# Patient Record
Sex: FEMALE | Race: WHITE | NOT HISPANIC OR LATINO | Employment: FULL TIME | URBAN - METROPOLITAN AREA
[De-identification: names, ages, dates, MRNs, and addresses within clinical notes are randomized per-mention and may not be internally consistent; named-entity substitution may affect disease eponyms.]

---

## 2017-02-20 ENCOUNTER — TELEPHONE (OUTPATIENT)
Dept: UROGYNECOLOGY | Facility: CLINIC | Age: 62
End: 2017-02-20

## 2017-02-20 NOTE — TELEPHONE ENCOUNTER
----- Message from Malissa Moran sent at 2/20/2017 11:14 AM CST -----  Contact:  call  //[phone]  please call   after  3:15     Calling to   Discuss  The  Patient  Having    Some    Problems   With   Bladder mesh a  And   Would  Like to  Be  Seen  Sooner and   Maybe  Take a  Urine  Test  odor to  Urine //please call  details

## 2017-02-20 NOTE — TELEPHONE ENCOUNTER
----- Message from Lexi Antonio sent at 2/20/2017 12:28 PM CST -----  Patient is returning nurse's (July) call/please call patient back after 3:15 today.

## 2017-03-02 ENCOUNTER — OFFICE VISIT (OUTPATIENT)
Dept: UROGYNECOLOGY | Facility: CLINIC | Age: 62
End: 2017-03-02
Payer: COMMERCIAL

## 2017-03-02 VITALS
WEIGHT: 110.69 LBS | SYSTOLIC BLOOD PRESSURE: 138 MMHG | TEMPERATURE: 98 F | BODY MASS INDEX: 20.37 KG/M2 | DIASTOLIC BLOOD PRESSURE: 77 MMHG | HEIGHT: 62 IN | HEART RATE: 83 BPM

## 2017-03-02 DIAGNOSIS — R39.15 URINARY URGENCY: ICD-10-CM

## 2017-03-02 DIAGNOSIS — N39.46 MIXED INCONTINENCE URGE AND STRESS: ICD-10-CM

## 2017-03-02 DIAGNOSIS — N81.6 RECTOCELE: ICD-10-CM

## 2017-03-02 DIAGNOSIS — R35.0 URINARY FREQUENCY: Primary | ICD-10-CM

## 2017-03-02 DIAGNOSIS — N81.11 MIDLINE CYSTOCELE: ICD-10-CM

## 2017-03-02 LAB
BILIRUB SERPL-MCNC: NORMAL MG/DL
BLOOD URINE, POC: NORMAL
COLOR, POC UA: YELLOW
GLUCOSE UR QL STRIP: NORMAL
KETONES UR QL STRIP: NORMAL
LEUKOCYTE ESTERASE URINE, POC: NORMAL
NITRITE, POC UA: NORMAL
PH, POC UA: 5
PROTEIN, POC: NORMAL
SPECIFIC GRAVITY, POC UA: 1.02
UROBILINOGEN, POC UA: NORMAL

## 2017-03-02 PROCEDURE — 81002 URINALYSIS NONAUTO W/O SCOPE: CPT | Mod: S$GLB,,, | Performed by: NURSE PRACTITIONER

## 2017-03-02 PROCEDURE — 99203 OFFICE O/P NEW LOW 30 MIN: CPT | Mod: 25,S$GLB,, | Performed by: NURSE PRACTITIONER

## 2017-03-02 PROCEDURE — 1160F RVW MEDS BY RX/DR IN RCRD: CPT | Mod: S$GLB,,, | Performed by: NURSE PRACTITIONER

## 2017-03-02 PROCEDURE — 99999 PR PBB SHADOW E&M-EST. PATIENT-LVL III: CPT | Mod: PBBFAC,,, | Performed by: NURSE PRACTITIONER

## 2017-03-02 RX ORDER — CHOLECALCIFEROL (VITAMIN D3) 25 MCG
185 TABLET ORAL DAILY
COMMUNITY

## 2017-03-02 RX ORDER — METRONIDAZOLE 7.5 MG/G
GEL VAGINAL
Refills: 3 | COMMUNITY
Start: 2017-01-25 | End: 2017-11-21

## 2017-03-02 NOTE — PROGRESS NOTES
"Subjective:       Patient ID: Mary Fitzpatrick is a 61 y.o. female.    Chief Complaint: Vaginal Discharge and Urinary Frequency    HPI  Mary Fitzpatrick is a 61 y.o. female who presents for possible UTI.  Pt was ill about a month ago with sinus congestion and bronchitis.  She was coughing so hard that she felt something pull in the RLQ.  Then about a week after the pulling sensation she had foul smelling urine and she saw some "tissue" being passed in the toilet.  She had cystocele, rectocele and sling/?mesh with Dr. Ortiz at least 4 years ago.  She was concerned that with this past URI that something had happened with the sling and she wanted to be checked out.  As she sits here today she denies any dysuria.  She denies any abdominal pain.  She has urinary frequency of q 1 hour during the day and occasional nocturia x 1.  She wears a thin panty liner and does have some leakage.  She does not always feel the leakage.  She does have some feeling of PVF at times.  She denies any vaginal discharge/ bleeding.  She is not currently sexually active, but when she was, she denies any dyspareunia.    Review of Systems   Constitutional: Negative for activity change, fever and unexpected weight change.   HENT: Negative for hearing loss.    Eyes: Negative for visual disturbance.   Respiratory: Negative for shortness of breath and wheezing.    Cardiovascular: Negative for chest pain, palpitations and leg swelling.   Gastrointestinal: Negative for abdominal pain, constipation and diarrhea.   Genitourinary: Positive for frequency and urgency. Negative for dyspareunia, dysuria, vaginal bleeding and vaginal discharge.   Musculoskeletal: Negative for gait problem and neck pain.   Skin: Negative for rash and wound.   Allergic/Immunologic: Negative for immunocompromised state.   Neurological: Negative for tremors, speech difficulty and weakness.   Hematological: Does not bruise/bleed easily.   Psychiatric/Behavioral: Negative for " agitation and confusion.       Objective:      Physical Exam   Constitutional: She is oriented to person, place, and time. She appears well-developed and well-nourished. No distress.   HENT:   Head: Normocephalic and atraumatic.   Neck: Neck supple. No thyromegaly present.   Pulmonary/Chest: Effort normal. No respiratory distress.   Abdominal: Soft. There is no tenderness. No hernia.   Musculoskeletal: Normal range of motion.   Neurological: She is alert and oriented to person, place, and time.   Skin: Skin is warm and dry. No rash noted.   Psychiatric: She has a normal mood and affect. Her behavior is normal.     Pelvic Exam:  V: No lesions. No palpable nodes.   Va: no discharge or bleeding.  Good length and support.  No feeling of mesh or scar tissue noted  Meatus:No caruncle or stenosis  Urethra: Non tender. No suburethral masses.  Cx/Cuff: Normal   Uterus: surgically absent  Ad: No mass or tenderness.  Levators :Symmetrical. Normal tone. Non tender.  BL: Non tender  RV: No hemorrhoids.      Assessment:       1. Urinary frequency    2. Urinary urgency    3. Mixed incontinence urge and stress    4. Midline cystocele    5. Rectocele        Plan:       Urinary frequency- pt does not wish to take any daily medication at this time.  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  NP did speak with pt about trial of myrbetriq or if she want to try ditropan IR on occasion when she is driving or on the weekends she will call and let us know.  -     POCT URINE DIPSTICK WITHOUT MICROSCOPE    Urinary urgency- as noted above    Mixed incontinence urge and stress- as noted above    Midline cystocele - surgically repaired    Rectocele- surgically repaired    RTC PRN

## 2017-03-03 ENCOUNTER — HOSPITAL ENCOUNTER (EMERGENCY)
Facility: HOSPITAL | Age: 62
Discharge: HOME | End: 2017-03-03
Attending: EMERGENCY MEDICINE
Payer: COMMERCIAL

## 2017-03-03 VITALS
DIASTOLIC BLOOD PRESSURE: 65 MMHG | TEMPERATURE: 98 F | BODY MASS INDEX: 20.24 KG/M2 | RESPIRATION RATE: 16 BRPM | OXYGEN SATURATION: 99 % | HEART RATE: 70 BPM | HEIGHT: 62 IN | SYSTOLIC BLOOD PRESSURE: 134 MMHG | WEIGHT: 110 LBS

## 2017-03-03 DIAGNOSIS — R42 DIZZINESS: ICD-10-CM

## 2017-03-03 DIAGNOSIS — H81.10 BENIGN POSITIONAL VERTIGO, UNSPECIFIED LATERALITY: Primary | ICD-10-CM

## 2017-03-03 LAB
ALBUMIN SERPL BCP-MCNC: 3.9 G/DL
ALP SERPL-CCNC: 58 U/L
ALT SERPL W/O P-5'-P-CCNC: 15 U/L
ANION GAP SERPL CALC-SCNC: 9 MMOL/L
AST SERPL-CCNC: 20 U/L
BASOPHILS # BLD AUTO: 0.1 K/UL
BASOPHILS NFR BLD: 1.2 %
BILIRUB SERPL-MCNC: 0.3 MG/DL
BUN SERPL-MCNC: 10 MG/DL
CALCIUM SERPL-MCNC: 9.9 MG/DL
CHLORIDE SERPL-SCNC: 107 MMOL/L
CO2 SERPL-SCNC: 26 MMOL/L
CREAT SERPL-MCNC: 0.8 MG/DL
DIFFERENTIAL METHOD: ABNORMAL
EOSINOPHIL # BLD AUTO: 0.1 K/UL
EOSINOPHIL NFR BLD: 1.6 %
ERYTHROCYTE [DISTWIDTH] IN BLOOD BY AUTOMATED COUNT: 14.1 %
EST. GFR  (AFRICAN AMERICAN): >60 ML/MIN/1.73 M^2
EST. GFR  (NON AFRICAN AMERICAN): >60 ML/MIN/1.73 M^2
GLUCOSE SERPL-MCNC: 94 MG/DL
HCT VFR BLD AUTO: 39.9 %
HGB BLD-MCNC: 13.1 G/DL
LYMPHOCYTES # BLD AUTO: 1.5 K/UL
LYMPHOCYTES NFR BLD: 35.2 %
MAGNESIUM SERPL-MCNC: 2.2 MG/DL
MCH RBC QN AUTO: 30.7 PG
MCHC RBC AUTO-ENTMCNC: 32.9 %
MCV RBC AUTO: 93 FL
MONOCYTES # BLD AUTO: 0.5 K/UL
MONOCYTES NFR BLD: 12.3 %
NEUTROPHILS # BLD AUTO: 2.1 K/UL
NEUTROPHILS NFR BLD: 49.7 %
PLATELET # BLD AUTO: 303 K/UL
PMV BLD AUTO: 7.6 FL
POTASSIUM SERPL-SCNC: 4.2 MMOL/L
PROT SERPL-MCNC: 6.5 G/DL
RBC # BLD AUTO: 4.29 M/UL
SODIUM SERPL-SCNC: 142 MMOL/L
WBC # BLD AUTO: 4.1 K/UL

## 2017-03-03 PROCEDURE — 36415 COLL VENOUS BLD VENIPUNCTURE: CPT

## 2017-03-03 PROCEDURE — 93005 ELECTROCARDIOGRAM TRACING: CPT

## 2017-03-03 PROCEDURE — 25500020 PHARM REV CODE 255: Performed by: EMERGENCY MEDICINE

## 2017-03-03 PROCEDURE — 80053 COMPREHEN METABOLIC PANEL: CPT

## 2017-03-03 PROCEDURE — 99284 EMERGENCY DEPT VISIT MOD MDM: CPT

## 2017-03-03 PROCEDURE — A9585 GADOBUTROL INJECTION: HCPCS | Performed by: EMERGENCY MEDICINE

## 2017-03-03 PROCEDURE — 85025 COMPLETE CBC W/AUTO DIFF WBC: CPT

## 2017-03-03 PROCEDURE — 83735 ASSAY OF MAGNESIUM: CPT

## 2017-03-03 RX ORDER — GADOBUTROL 604.72 MG/ML
INJECTION INTRAVENOUS
Status: DISCONTINUED
Start: 2017-03-03 | End: 2017-03-03 | Stop reason: HOSPADM

## 2017-03-03 RX ORDER — MECLIZINE HYDROCHLORIDE 25 MG/1
25 TABLET ORAL 3 TIMES DAILY PRN
Qty: 20 TABLET | Refills: 0 | Status: SHIPPED | OUTPATIENT
Start: 2017-03-03 | End: 2018-12-13

## 2017-03-03 RX ORDER — GADOBUTROL 604.72 MG/ML
5 INJECTION INTRAVENOUS
Status: COMPLETED | OUTPATIENT
Start: 2017-03-03 | End: 2017-03-03

## 2017-03-03 RX ADMIN — GADOBUTROL 5 ML: 604.72 INJECTION INTRAVENOUS at 10:03

## 2017-03-03 NOTE — ED PROVIDER NOTES
Encounter Date: 3/3/2017       History     Chief Complaint   Patient presents with    Dizziness     yesterday and today      Review of patient's allergies indicates:   Allergen Reactions    Penicillins Anaphylaxis    Sulfa (sulfonamide antibiotics) Hives     HPI Comments: 61-year-old female with a past medical history of mitral valve prolapse and arthritis presents with a chief complaint of dizziness.  She reports it feels like the room is spinning, and that the symptoms started acutely 2 days ago.  She reports her symptoms are worse with sitting forward and are intermittent at times.  She also reports some visual changes to her right eye yesterday, where it looked as if she was looking through a crystal  for a jared. she reports visual changes have resolved.  She denies any associated paresthesias, changes in speech, weakness, chest pain, nausea/vomiting, or headache.  She has not taken anything for relief for symptoms.    The history is provided by the patient.     Past Medical History:   Diagnosis Date    Allergy     Arthritis     HEARING LOSS     Mitral valve prolapse     Scoliosis      Past Surgical History:   Procedure Laterality Date    BLADDER SUSPENSION      2006 2012    BUNIONECTOMY      HYSTERECTOMY       History reviewed. No pertinent family history.  Social History   Substance Use Topics    Smoking status: Never Smoker    Smokeless tobacco: None    Alcohol use Yes      Comment: 1-2 drinks social     Review of Systems   Constitutional: Negative for chills and fever.   HENT: Negative for congestion.    Eyes: Positive for visual disturbance.   Respiratory: Negative for cough and shortness of breath.    Cardiovascular: Negative for chest pain.   Gastrointestinal: Negative for abdominal pain, nausea and vomiting.   Genitourinary: Negative for dysuria.   Musculoskeletal: Negative for gait problem.   Skin: Negative for color change.   Neurological: Positive for dizziness. Negative for numbness.    Psychiatric/Behavioral: Negative for agitation.       Physical Exam   Initial Vitals   BP Pulse Resp Temp SpO2   03/03/17 0824 03/03/17 0824 03/03/17 0824 03/03/17 0824 03/03/17 0824   144/67 79 18 98.3 °F (36.8 °C) 99 %     Physical Exam    Nursing note and vitals reviewed.  Constitutional: She appears well-developed and well-nourished.   HENT:   Head: Atraumatic.   Eyes: EOM are normal. Pupils are equal, round, and reactive to light.   Neck: Normal range of motion.   Cardiovascular: Normal rate and regular rhythm.   Pulmonary/Chest: Breath sounds normal.   Abdominal: Soft. Bowel sounds are normal.   Musculoskeletal: Normal range of motion.        Right shoulder: Normal.        Left shoulder: Normal.   Neurological: She is alert and oriented to person, place, and time.   Skin: Skin is warm and dry.   Psychiatric: She has a normal mood and affect.         ED Course   Procedures  Labs Reviewed   CBC W/ AUTO DIFFERENTIAL - Abnormal; Notable for the following:        Result Value    MPV 7.6 (*)     All other components within normal limits   COMPREHENSIVE METABOLIC PANEL   MAGNESIUM             Medical Decision Making:   Initial Assessment:   61-year-old female presented with a chief complaint of dizziness.  Differential Diagnosis:   Initial differential diagnosis included but not limited to intracranial mass, CVA, TIA, and benign positional vertigo.  Clinical Tests:   Lab Tests: Ordered and Reviewed  Radiological Study: Ordered and Reviewed  ED Management:  The patient was emergently evaluated in the ED, her evaluation was significant for a middle-aged female with a normal neurologic exam at present.  The patient's labs showed no acute abnormalities.  The patient's dizziness is resolved at present.  The patient's MRI of her brain showed no acute abnormalities.  The etiology for her symptoms is likely benign positional vertigo.  She is stable for discharge to home.  She'll be discharged home with by mouth meclizine  and she is to follow-up with her ENT physician for further care.                     ED Course     Clinical Impression:   The primary encounter diagnosis was Benign positional vertigo, unspecified laterality. A diagnosis of Dizziness was also pertinent to this visit.          Jett Molina MD  03/03/17 8428

## 2017-03-03 NOTE — DISCHARGE INSTRUCTIONS
Benign Paroxysmal Positional Vertigo  Benign paroxysmal positional vertigo (BPPV) is a problem with the inner ear. The inner ear contains the vestibular system. This system is what helps you keep your balance. BPPV causes a feeling of spinning. It is a common problem of the vestibular system.  Understanding the vestibular system  The vestibular system of the ear is made up of very tiny parts. They include the utricle, saccule, and semicircular canals. The utricle is a tiny organ that contains calcium crystals. In some people, the crystals can move into the semicircular canals. When this happens, the system no longer works as it should. This causes BPPV. Benign means it is not life-threatening. Paroxysmal means it happens suddenly. Positional means that it happens when you move your head. Vertigo is a feeling of spinning.  What causes BPPV?  Causes include injury to your head or neck. Other problems with the vestibular system may cause BPPV. In many people, the cause of BPPV is not known.  Symptoms of BPPV  You many have repeated feelings of spinning (vertigo). The vertigo usually lasts less than 1 minute. Some movements, suchas rolling over in bed, can bring on vertigo.  Diagnosing BPPV  Your primary health care provider may diagnose and treat your BPPV. Or you may see an ear, nose, and throat doctor (otolaryngologist). In some cases, you may see a nervous system doctor (neurologist).  The health care provider will ask about your symptoms and your medical history. He or she will examine you. You may have hearing and balance tests. As part of the exam, your health care provider may have you move your head and body in certain ways. If you have BPPV, the movements can bring on vertigo. Your provider will also look for abnormal movements of your eyes. You may have other tests to check your vestibular or nervous systems.  Treatment for BPPV  Your health care provider may try to move the calcium crystals. This is done  by having you move your head and neck in certain ways. This treatment is safe and often works well. You may also be told to do these movements at home. You may still have vertigo for a few weeks. Your health care provider will recheck your symptoms, usually in about a month. Special physical therapy may also be part of treatment.  In rare cases surgery may be needed for BPPV that does not go away.     When to call the health care provider  Call your health care provider right away if you have any of these:  · Symptoms that do not go away with treatment  · Symptoms that get worse  · New symptoms   Date Last Reviewed: 3/19/2015  © 5030-7344 [employer]. [address] [place] [address], [place] [address]. All rights reserved. This information is not intended as a substitute for professional medical care. Always follow your healthcare professional's instructions.

## 2017-03-03 NOTE — ED AVS SNAPSHOT
[facility] [address]  [facility] [address]               Mary Fitzpatrick   3/3/2017  8:27 AM   ED    Description:  Female : 1955   Department:  [facility]           Your Care was Coordinated By:     Provider Role From To    Jett Molina MD Attending Provider 17 0833 --      Reason for Visit     Dizziness           Diagnoses this Visit        Comments    Benign positional vertigo, unspecified laterality    -  Primary     Dizziness           ED Disposition     None           To Do List           Follow-up Information     Follow up with Alek Mejia MD. Schedule an appointment as soon as possible for a visit in 1 week.    Specialty:  Otolaryngology    Why:  return to the ED, As needed, If symptoms worsen    Contact information:    [address]  [facility] [address] [phone]         These Medications        Disp Refills Start End    meclizine (ANTIVERT) 25 mg tablet 20 tablet 0 3/3/2017     Take 1 tablet (25 mg total) by mouth 3 (three) times daily as needed for Dizziness. - Oral    Pharmacy: [facility] [id number] [place] [address] - [address] AT [facility] Ph #: [phone]         [facility]     [facility] -  Assistance  Registered nurses in the [facility] provide clinical advisement, health education, appointment booking, and other advisory services.  Call for this free service at [phone].             Medications           Message regarding Medications     Verify the changes and/or additions to your medication regime listed below are the same as discussed with your clinician today.  If any of these changes or additions are incorrect, please notify your healthcare provider.        START taking these NEW medications        Refills    meclizine (ANTIVERT) 25 mg tablet 0    Sig: Take 1 tablet (25 mg total) by mouth 3 (three)  "times daily as needed for Dizziness.    Class: Print    Route: Oral      These medications were administered today        Dose Freq    gadobutrol 10 mmol/10 mL (1 mmol/mL) Soln      Notes to Pharmacy: Created by cabinet override    gadobutrol 5 mL 5 mL IMG once as needed    Sig: Inject 5 mLs into the vein ONCE PRN for contrast.    Class: Normal    Route: Intravenous           Verify that the below list of medications is an accurate representation of the medications you are currently taking.  If none reported, the list may be blank. If incorrect, please contact your healthcare provider. Carry this list with you in case of emergency.           Current Medications     estradiol 0.05 mg/24 hr td ptsw (VIVELLE-DOT) 0.05 mg/24 hr Place 1 patch onto the skin once a week.    gadobutrol 10 mmol/10 mL (1 mmol/mL) Soln     meclizine (ANTIVERT) 25 mg tablet Take 1 tablet (25 mg total) by mouth 3 (three) times daily as needed for Dizziness.    metronidazole (METROGEL) 0.75 % vaginal gel NIK 1 APPLICATION VAGINALLY QHS FOR 5 DAYS    PANTOPRAZOLE SODIUM (PROTONIX ORAL) Take by mouth.    vitamin D 1000 units Tab Take 185 mg by mouth once daily.           Clinical Reference Information           Your Vitals Were     BP Pulse Temp Resp Height Weight    144/67 79 98.3 °F (36.8 °C) (Oral) 18 5' 2" (1.575 m) 49.9 kg (110 lb)    SpO2 BMI             99% 20.12 kg/m2         Allergies as of 3/3/2017        Reactions    Penicillins Anaphylaxis    Sulfa (Sulfonamide Antibiotics) Hives      Immunizations Administered on Date of Encounter - 3/3/2017     None      ED Micro, Lab, POCT     Start Ordered       Status Ordering Provider    03/03/17 0902 03/03/17 0901  Comprehensive Metabolic Panel (CMP)  STAT      Final result     03/03/17 0902 03/03/17 0901  Complete Blood Count (CBC)  STAT      Final result     03/03/17 0902 03/03/17 0901  Magnesium  STAT      Final result       ED Imaging Orders     Start Ordered       Status Ordering Provider    " 03/03/17 0902 03/03/17 0901  MRI Brain W WO Contrast  1 time imaging      Final result         Discharge Instructions         Benign Paroxysmal Positional Vertigo  Benign paroxysmal positional vertigo (BPPV) is a problem with the inner ear. The inner ear contains the vestibular system. This system is what helps you keep your balance. BPPV causes a feeling of spinning. It is a common problem of the vestibular system.  Understanding the vestibular system  The vestibular system of the ear is made up of very tiny parts. They include the utricle, saccule, and semicircular canals. The utricle is a tiny organ that contains calcium crystals. In some people, the crystals can move into the semicircular canals. When this happens, the system no longer works as it should. This causes BPPV. Benign means it is not life-threatening. Paroxysmal means it happens suddenly. Positional means that it happens when you move your head. Vertigo is a feeling of spinning.  What causes BPPV?  Causes include injury to your head or neck. Other problems with the vestibular system may cause BPPV. In many people, the cause of BPPV is not known.  Symptoms of BPPV  You many have repeated feelings of spinning (vertigo). The vertigo usually lasts less than 1 minute. Some movements, suchas rolling over in bed, can bring on vertigo.  Diagnosing BPPV  Your primary health care provider may diagnose and treat your BPPV. Or you may see an ear, nose, and throat doctor (otolaryngologist). In some cases, you may see a nervous system doctor (neurologist).  The health care provider will ask about your symptoms and your medical history. He or she will examine you. You may have hearing and balance tests. As part of the exam, your health care provider may have you move your head and body in certain ways. If you have BPPV, the movements can bring on vertigo. Your provider will also look for abnormal movements of your eyes. You may have other tests to check your  vestibular or nervous systems.  Treatment for BPPV  Your health care provider may try to move the calcium crystals. This is done by having you move your head and neck in certain ways. This treatment is safe and often works well. You may also be told to do these movements at home. You may still have vertigo for a few weeks. Your health care provider will recheck your symptoms, usually in about a month. Special physical therapy may also be part of treatment.  In rare cases surgery may be needed for BPPV that does not go away.     When to call the health care provider  Call your health care provider right away if you have any of these:  · Symptoms that do not go away with treatment  · Symptoms that get worse  · New symptoms   Date Last Reviewed: 3/19/2015  © 1701-9490 [employer]. [address] [place] [address], [place] [address]. All rights reserved. This information is not intended as a substitute for professional medical care. Always follow your healthcare professional's instructions.          Your Scheduled Appointments     Mar 03, 2017  7:30 PM CST   Mri Brain Cont with [facility] MRI1 300 LB LIMIT   [facility] ([place] [facility]) [address] [place] [id number] [phone]              [facility] Sign-Up     Activating your [facility] account is as easy as 1-2-3!     1) Visit [email or web address], select Sign Up Now, enter this activation code and your date of birth, then select Next.  [email or web address]  Expires: 4/16/2017 10:05 AM      2) Create a username and password to use when you visit [facility] in the future and select a security question in case you lose your password and select Next.    3) Enter your e-mail address and click Sign Up!    Additional Information  If you have questions, please e-mail [email or web address] or call [phone] to talk to our [facility] staff. Remember, [facility] is NOT to be used for urgent needs. For medical emergencies, dial 911.           [facility] complies with applicable Federal civil rights laws and does not discriminate on the basis of race, color, national origin, age, disability, or sex.        Language Assistance Services     ATTENTION: Language assistance services are available, free of charge. Please call [phone].      ATENCIÓN: Si habla español, tiene a chance disposición servicios gratuitos de asistencia lingüística. Llame al [phone].     CHÚ Ý: N?u b?n nói Ti?ng Vi?t, có các d?ch v? h? tr? ngôn ng? mi?n phí dành cho b?n. G?i s? [phone].

## 2017-03-17 ENCOUNTER — TELEPHONE (OUTPATIENT)
Dept: UROGYNECOLOGY | Facility: CLINIC | Age: 62
End: 2017-03-17

## 2017-03-17 NOTE — TELEPHONE ENCOUNTER
----- Message from Malissa Moran sent at 3/17/2017  2:19 PM CDT -----  Contact:   call  //[phone]    Calling about a  Med   For overactive  Bladder //  Please call

## 2017-03-17 NOTE — TELEPHONE ENCOUNTER
Pt has thought about her options with medication for OAB and she would like to try the Myrbetriq. Please advise. Thanks.

## 2017-03-21 NOTE — TELEPHONE ENCOUNTER
I have sent in myrbetriq 25 mg to [facility] on .  If this is too expensive, please call our office and let us know, don't get it.  Also I would like to have a follow up with her in about a month.  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

## 2017-03-27 ENCOUNTER — TELEPHONE (OUTPATIENT)
Dept: UROGYNECOLOGY | Facility: CLINIC | Age: 62
End: 2017-03-27

## 2017-03-27 NOTE — TELEPHONE ENCOUNTER
----- Message from Essie Alida sent at 3/24/2017  3:54 PM CDT -----  Returning your call.  Please call patient at [phone].

## 2017-03-27 NOTE — TELEPHONE ENCOUNTER
----- Message from Sydney Portillo sent at 3/27/2017 12:24 PM CDT -----  Contact: self  Patient returning call to July    Please call her at  anytime after 3:30   Thanks

## 2017-04-18 ENCOUNTER — OFFICE VISIT (OUTPATIENT)
Dept: UROGYNECOLOGY | Facility: CLINIC | Age: 62
End: 2017-04-18
Payer: COMMERCIAL

## 2017-04-18 VITALS
TEMPERATURE: 99 F | HEART RATE: 74 BPM | HEIGHT: 62 IN | WEIGHT: 109.56 LBS | SYSTOLIC BLOOD PRESSURE: 102 MMHG | BODY MASS INDEX: 20.16 KG/M2 | DIASTOLIC BLOOD PRESSURE: 62 MMHG

## 2017-04-18 DIAGNOSIS — N39.46 MIXED INCONTINENCE URGE AND STRESS: ICD-10-CM

## 2017-04-18 DIAGNOSIS — R35.0 URINARY FREQUENCY: Primary | ICD-10-CM

## 2017-04-18 DIAGNOSIS — R39.15 URINARY URGENCY: ICD-10-CM

## 2017-04-18 LAB
BILIRUB SERPL-MCNC: NORMAL MG/DL
BLOOD URINE, POC: NORMAL
COLOR, POC UA: YELLOW
GLUCOSE UR QL STRIP: NORMAL
KETONES UR QL STRIP: NORMAL
LEUKOCYTE ESTERASE URINE, POC: NORMAL
NITRITE, POC UA: NORMAL
PH, POC UA: 7
PROTEIN, POC: NORMAL
SPECIFIC GRAVITY, POC UA: 1.01
UROBILINOGEN, POC UA: NORMAL

## 2017-04-18 PROCEDURE — 99999 PR PBB SHADOW E&M-EST. PATIENT-LVL III: CPT | Mod: PBBFAC,,, | Performed by: NURSE PRACTITIONER

## 2017-04-18 PROCEDURE — 1160F RVW MEDS BY RX/DR IN RCRD: CPT | Mod: S$GLB,,, | Performed by: NURSE PRACTITIONER

## 2017-04-18 PROCEDURE — 99213 OFFICE O/P EST LOW 20 MIN: CPT | Mod: 25,S$GLB,, | Performed by: NURSE PRACTITIONER

## 2017-04-18 PROCEDURE — 81002 URINALYSIS NONAUTO W/O SCOPE: CPT | Mod: S$GLB,,, | Performed by: NURSE PRACTITIONER

## 2017-04-18 RX ORDER — PANTOPRAZOLE SODIUM 40 MG/1
TABLET, DELAYED RELEASE ORAL
Refills: 6 | COMMUNITY
Start: 2017-03-26 | End: 2017-11-21

## 2017-04-18 NOTE — PROGRESS NOTES
Subjective:       Patient ID: Mary Fitzpatrick is a 61 y.o. female.    Chief Complaint: Follow-up medication    HPI  Mary Fitzpatrick is a 61 y.o. female who presents today for follow up on her myrbetriq.  She was started on the myrbetriq 25 mg on 03/17/17.  She has noticed a significant improvement in her symptoms.  She has a decrease in her frequency to about every 4-5 x during the day at home.  When she is at work it is probably x 2 during the day.  She occasionally has nocturia x 1 but this mostly occurs when she is drinking coffee to late in the day.  Her urgency which is probably her most bothersome symptom has dramatically decreased.  She still has occasional incontinence, but this has also improved.  Her biggest challenge is when she is riding in the car.  It seems that when she is in the car she needs to urinate every 30 minutes.    She denies any side effects with the medication.  She is overall happy with her status at this time.    Review of Systems   Constitutional: Negative for activity change, fever and unexpected weight change.   HENT: Negative for hearing loss.    Eyes: Negative for visual disturbance.   Respiratory: Negative for shortness of breath and wheezing.    Cardiovascular: Negative for chest pain, palpitations and leg swelling.   Gastrointestinal: Negative for abdominal pain, constipation and diarrhea.   Genitourinary: Positive for frequency. Negative for dyspareunia, dysuria, urgency, vaginal bleeding and vaginal discharge.   Musculoskeletal: Negative for gait problem and neck pain.   Skin: Negative for rash and wound.   Allergic/Immunologic: Negative for immunocompromised state.   Neurological: Negative for tremors, speech difficulty and weakness.   Hematological: Does not bruise/bleed easily.   Psychiatric/Behavioral: Negative for agitation and confusion.       Objective:      Physical Exam   Constitutional: She is oriented to person, place, and time. She appears well-developed and  well-nourished. No distress.   HENT:   Head: Normocephalic and atraumatic.   Neck: Neck supple. No thyromegaly present.   Pulmonary/Chest: Effort normal. No respiratory distress.   Abdominal: Soft. There is no tenderness. No hernia.   Musculoskeletal: Normal range of motion.   Neurological: She is alert and oriented to person, place, and time.   Skin: Skin is warm and dry. No rash noted.   Psychiatric: She has a normal mood and affect. Her behavior is normal.     Pelvic Exam:  Deferred at this time.      Assessment:       1. Urinary frequency    2. Urinary urgency    3. Mixed incontinence urge and stress        Plan:       Urinary frequency- continue with the myrbetriq at this time.  If she notices a worsening in her symptoms, we will increase the myrbetriq to 50 mg daily.    -     POCT URINE DIPSTICK WITHOUT MICROSCOPE    Urinary urgency- as noted above    Mixed incontinence urge and stress- as noted above    RTC 6 months or PRN

## 2017-11-15 RX ORDER — MIRABEGRON 25 MG/1
TABLET, FILM COATED, EXTENDED RELEASE ORAL
Qty: 30 TABLET | Refills: 5 | Status: SHIPPED | OUTPATIENT
Start: 2017-11-15 | End: 2018-08-20 | Stop reason: SDUPTHER

## 2017-11-21 ENCOUNTER — HOSPITAL ENCOUNTER (EMERGENCY)
Facility: HOSPITAL | Age: 62
Discharge: HOME | End: 2017-11-21
Attending: EMERGENCY MEDICINE
Payer: COMMERCIAL

## 2017-11-21 VITALS
WEIGHT: 101 LBS | RESPIRATION RATE: 18 BRPM | HEART RATE: 76 BPM | DIASTOLIC BLOOD PRESSURE: 63 MMHG | OXYGEN SATURATION: 98 % | SYSTOLIC BLOOD PRESSURE: 129 MMHG | TEMPERATURE: 98 F | HEIGHT: 62 IN | BODY MASS INDEX: 18.58 KG/M2

## 2017-11-21 DIAGNOSIS — N30.01 ACUTE CYSTITIS WITH HEMATURIA: Primary | ICD-10-CM

## 2017-11-21 LAB
BACTERIA #/AREA URNS HPF: ABNORMAL /HPF
BILIRUB UR QL STRIP: NEGATIVE
CLARITY UR: ABNORMAL
COLOR UR: YELLOW
GLUCOSE UR QL STRIP: NEGATIVE
HGB UR QL STRIP: ABNORMAL
HYALINE CASTS #/AREA URNS LPF: 0 /LPF
KETONES UR QL STRIP: NEGATIVE
LEUKOCYTE ESTERASE UR QL STRIP: ABNORMAL
MICROSCOPIC COMMENT: ABNORMAL
NITRITE UR QL STRIP: NEGATIVE
PH UR STRIP: >8 [PH] (ref 5–8)
PROT UR QL STRIP: ABNORMAL
RBC #/AREA URNS HPF: >100 /HPF (ref 0–4)
SP GR UR STRIP: 1.02 (ref 1–1.03)
URN SPEC COLLECT METH UR: ABNORMAL
UROBILINOGEN UR STRIP-ACNC: 1 EU/DL
WBC #/AREA URNS HPF: >100 /HPF (ref 0–5)

## 2017-11-21 PROCEDURE — 25000003 PHARM REV CODE 250: Performed by: PHYSICIAN ASSISTANT

## 2017-11-21 PROCEDURE — 87186 SC STD MICRODIL/AGAR DIL: CPT

## 2017-11-21 PROCEDURE — 87077 CULTURE AEROBIC IDENTIFY: CPT

## 2017-11-21 PROCEDURE — 99283 EMERGENCY DEPT VISIT LOW MDM: CPT

## 2017-11-21 PROCEDURE — 87088 URINE BACTERIA CULTURE: CPT

## 2017-11-21 PROCEDURE — 87086 URINE CULTURE/COLONY COUNT: CPT

## 2017-11-21 PROCEDURE — 81000 URINALYSIS NONAUTO W/SCOPE: CPT

## 2017-11-21 RX ORDER — CIPROFLOXACIN 500 MG/1
500 TABLET ORAL 2 TIMES DAILY
Qty: 14 TABLET | Refills: 0 | Status: SHIPPED | OUTPATIENT
Start: 2017-11-21 | End: 2017-11-28

## 2017-11-21 RX ORDER — PHENAZOPYRIDINE HYDROCHLORIDE 100 MG/1
200 TABLET, FILM COATED ORAL
Status: COMPLETED | OUTPATIENT
Start: 2017-11-21 | End: 2017-11-21

## 2017-11-21 RX ORDER — GABAPENTIN 100 MG/1
100 CAPSULE ORAL NIGHTLY
COMMUNITY

## 2017-11-21 RX ORDER — CIPROFLOXACIN 500 MG/1
500 TABLET ORAL
Status: COMPLETED | OUTPATIENT
Start: 2017-11-21 | End: 2017-11-21

## 2017-11-21 RX ORDER — PHENAZOPYRIDINE HYDROCHLORIDE 200 MG/1
200 TABLET, FILM COATED ORAL 3 TIMES DAILY
Qty: 6 TABLET | Refills: 0 | Status: SHIPPED | OUTPATIENT
Start: 2017-11-21 | End: 2017-12-01

## 2017-11-21 RX ADMIN — PHENAZOPYRIDINE HYDROCHLORIDE 200 MG: 100 TABLET ORAL at 10:11

## 2017-11-21 RX ADMIN — CIPROFLOXACIN 500 MG: 500 TABLET, FILM COATED ORAL at 10:11

## 2017-11-22 NOTE — ED PROVIDER NOTES
Encounter Date: 11/21/2017    SCRIBE #1 NOTE: I, Melida Macdonald, am scribing for, and in the presence of, Ana Enriquez PA-C.       History     Chief Complaint   Patient presents with    Urinary Frequency     pain, frequency, blood in urine       11/21/2017  9:48 PM    Chief Complaint: Urinary Frequency      The patient is a 61 y.o. female who presents with urinary frequency x 3 days ago with associated symptoms of dysuria, decreased urine and hematuria. Pt endorses chronic back pain. Pt endorses concerns for UTI. Pt reports she has not taken anything for pain. Denies fever. Allergic to penicillin and sulfa. PMHx includes mtral valve prolapse and scoliosis. PSHx includes bladder suspension and hysterectomy.         The history is provided by the patient.     Review of patient's allergies indicates:   Allergen Reactions    Penicillins Anaphylaxis    Sulfa (sulfonamide antibiotics) Hives     Past Medical History:   Diagnosis Date    Allergy     Arthritis     HEARING LOSS     Mitral valve prolapse     Scoliosis      Past Surgical History:   Procedure Laterality Date    BLADDER SUSPENSION      2006 2012    BUNIONECTOMY      HYSTERECTOMY       History reviewed. No pertinent family history.  Social History   Substance Use Topics    Smoking status: Never Smoker    Smokeless tobacco: Never Used    Alcohol use Yes      Comment: 1-2 drinks social     Review of Systems   Constitutional: Negative for chills and fever.   HENT: Negative for facial swelling and trouble swallowing.    Eyes: Negative for discharge.   Respiratory: Negative for cough, chest tightness, shortness of breath and wheezing.    Cardiovascular: Negative for chest pain and palpitations.   Gastrointestinal: Negative for abdominal pain, diarrhea, nausea and vomiting.   Genitourinary: Positive for decreased urine volume, dysuria, frequency and hematuria.   Musculoskeletal: Positive for back pain (Chronic). Negative for arthralgias, joint swelling,  myalgias, neck pain and neck stiffness.   Skin: Negative for color change, pallor, rash and wound.   Neurological: Negative for dizziness, syncope, weakness, light-headedness, numbness and headaches.   Hematological: Does not bruise/bleed easily.   Psychiatric/Behavioral: The patient is not nervous/anxious.        Physical Exam     Initial Vitals [11/21/17 2132]   BP Pulse Resp Temp SpO2   129/63 76 18 97.9 °F (36.6 °C) 98 %      MAP       85         Physical Exam    Nursing note and vitals reviewed.  Constitutional: She appears well-developed and well-nourished. She is not diaphoretic. No distress.   HENT:   Head: Normocephalic and atraumatic.   Right Ear: External ear normal.   Left Ear: External ear normal.   Nose: Nose normal.   Mouth/Throat: Oropharynx is clear and moist.   Eyes: Conjunctivae are normal.   Neck: Normal range of motion. Neck supple.   Cardiovascular: Normal rate, regular rhythm, normal heart sounds and intact distal pulses.   No murmur heard.  Pulmonary/Chest: Breath sounds normal. No respiratory distress. She has no wheezes. She has no rhonchi. She has no rales.   Abdominal: Soft. She exhibits no distension and no mass. There is tenderness in the suprapubic area. There is no CVA tenderness.   Musculoskeletal: Normal range of motion. She exhibits no edema or tenderness.   Lymphadenopathy:     She has no cervical adenopathy.   Neurological: She is alert and oriented to person, place, and time. She has normal strength. No sensory deficit.   Skin: Skin is warm and dry. No rash and no abscess noted. No erythema.   Psychiatric: She has a normal mood and affect.         ED Course   Procedures  Labs Reviewed   URINALYSIS             Medical Decision Making:   History:   Old Medical Records: I decided to obtain old medical records.  Differential Diagnosis:   UTI  Pyelonephritis  Renal Colic  Acute abdomen    Clinical Tests:   Lab Tests: Ordered and Reviewed       APC / Resident Notes:   Urinalysis shows  evidence for infection.  Urine culture will be sent.  She is allergic to PCN, and we will also avoid cephalosporins.  She will be given prescription for Cipro and culture will be followed.  She is also given a prescription for pyridium.  Low suspicion for renal colic or pyelonephritis at this time, and no further imaging or testing necessary.  She voices understanding and is agreeable to the plan.  She is given specific return precautions.        Scribe Attestation:   Scribe #1: I performed the above scribed service and the documentation accurately describes the services I performed. I attest to the accuracy of the note.    Attending Attestation:     Physician Attestation Statement for NP/PA:   I have conducted a face to face encounter with this patient in addition to the NP/PA, due to Medical Complexity    Other NP/PA Attestation Additions:    History of Present Illness: 61-year-old female presented with a chief complaint of urinary frequency.   Physical Exam: Awake, alert, and oriented ×4.   Medical Decision Making: Initial differential diagnosis included but not limited to pyelonephritis, cystitis, and cervicitis.  The patient's urine shows evidence of infection.  The patient likely has a cystitis.  The patient started on by mouth Cipro in the emergency Department.  She'll be discharged home with by mouth Cipro and by mouth Pyridium.  She is to follow-up with her PCP for further care.           *  I, Ana Enriquez PA-C, personally performed the services described in this documentation. All medical record entries made by the scribe were at my direction and in my presence.  I have reviewed the chart and agree that the record reflects my personal performance and is accurate and complete. Ana Enriquez PA-C.  12:54 AM 11/22/2017       ED Course      Clinical Impression:   No diagnosis found.    1. Acute cystitis with hematuria                              Ana Enriquez PA-C  11/22/17 0054    Jett DONNELLY  MD Jesse  11/22/17 0101

## 2017-11-24 LAB — BACTERIA UR CULT: NORMAL

## 2018-08-21 RX ORDER — MIRABEGRON 25 MG/1
TABLET, FILM COATED, EXTENDED RELEASE ORAL
Qty: 30 TABLET | Refills: 0 | Status: SHIPPED | OUTPATIENT
Start: 2018-08-21 | End: 2018-08-23

## 2018-08-23 ENCOUNTER — OFFICE VISIT (OUTPATIENT)
Dept: UROGYNECOLOGY | Facility: CLINIC | Age: 63
End: 2018-08-23
Payer: COMMERCIAL

## 2018-08-23 VITALS
HEART RATE: 68 BPM | HEIGHT: 62 IN | DIASTOLIC BLOOD PRESSURE: 66 MMHG | BODY MASS INDEX: 18.7 KG/M2 | SYSTOLIC BLOOD PRESSURE: 103 MMHG | WEIGHT: 101.63 LBS

## 2018-08-23 DIAGNOSIS — R35.0 URINARY FREQUENCY: Primary | ICD-10-CM

## 2018-08-23 DIAGNOSIS — N39.46 MIXED INCONTINENCE URGE AND STRESS: ICD-10-CM

## 2018-08-23 LAB
BILIRUB SERPL-MCNC: ABNORMAL MG/DL
BLOOD URINE, POC: ABNORMAL
COLOR, POC UA: ABNORMAL
GLUCOSE UR QL STRIP: ABNORMAL
KETONES UR QL STRIP: ABNORMAL
LEUKOCYTE ESTERASE URINE, POC: ABNORMAL
NITRITE, POC UA: ABNORMAL
PH, POC UA: 5
PROTEIN, POC: ABNORMAL
SPECIFIC GRAVITY, POC UA: 1.02
UROBILINOGEN, POC UA: ABNORMAL

## 2018-08-23 PROCEDURE — 81002 URINALYSIS NONAUTO W/O SCOPE: CPT | Mod: S$GLB,,, | Performed by: NURSE PRACTITIONER

## 2018-08-23 PROCEDURE — 99999 PR PBB SHADOW E&M-EST. PATIENT-LVL III: CPT | Mod: PBBFAC,,, | Performed by: NURSE PRACTITIONER

## 2018-08-23 PROCEDURE — 99213 OFFICE O/P EST LOW 20 MIN: CPT | Mod: 25,S$GLB,, | Performed by: NURSE PRACTITIONER

## 2018-08-23 PROCEDURE — 3008F BODY MASS INDEX DOCD: CPT | Mod: CPTII,S$GLB,, | Performed by: NURSE PRACTITIONER

## 2018-08-23 RX ORDER — ONDANSETRON 4 MG/1
TABLET, FILM COATED ORAL
Refills: 0 | COMMUNITY
Start: 2018-08-14 | End: 2018-12-13

## 2018-08-23 RX ORDER — HYDROCODONE BITARTRATE AND ACETAMINOPHEN 10; 325 MG/1; MG/1
TABLET ORAL
Refills: 0 | COMMUNITY
Start: 2018-08-14 | End: 2018-12-13

## 2018-08-23 RX ORDER — IBANDRONATE SODIUM 150 MG/1
TABLET, FILM COATED ORAL
COMMUNITY
Start: 2018-07-02 | End: 2019-01-31

## 2018-08-23 RX ORDER — GABAPENTIN 300 MG/1
CAPSULE ORAL
Refills: 5 | COMMUNITY
Start: 2018-07-09 | End: 2018-12-13

## 2018-08-23 RX ORDER — ZOSTER VACCINE RECOMBINANT, ADJUVANTED 50 MCG/0.5
KIT INTRAMUSCULAR
Refills: 0 | COMMUNITY
Start: 2018-07-09 | End: 2018-12-13

## 2018-08-23 RX ORDER — ERGOCALCIFEROL 1.25 MG/1
CAPSULE ORAL
Refills: 2 | COMMUNITY
Start: 2018-08-01 | End: 2018-12-13

## 2018-08-23 RX ORDER — PANTOPRAZOLE SODIUM 40 MG/1
TABLET, DELAYED RELEASE ORAL
Refills: 6 | COMMUNITY
Start: 2018-08-01

## 2018-08-23 RX ORDER — IBUPROFEN 600 MG/1
TABLET ORAL
Refills: 1 | COMMUNITY
Start: 2018-06-01 | End: 2018-12-13

## 2018-08-23 RX ORDER — PHENOBARBITAL, HYOSCYAMINE SULFATE, ATROPINE SULFATE, SCOPOLAMINE HYDROBROMIDE 16.2; .1037; .0194; .0065 MG/1; MG/1; MG/1; MG/1
TABLET ORAL
Refills: 1 | COMMUNITY
Start: 2018-08-06 | End: 2018-12-13

## 2018-08-23 NOTE — PROGRESS NOTES
Subjective:       Patient ID: Mary Fitzpatrick is a 62 y.o. female.    Chief Complaint: medication follow up    HPI  Mary Fitzpatrick is a 62 y.o. female who presents today for follow up regarding her myrbetriq.  She has been taking the myrbetriq 25 mg and felt that it is working well for her.  However, she is a  and they have changed her schedule so that she is having to wait from 8-12:30 before she can go to the restroom.  She is limiting her fluid intake and finding that she is having more leakage.  She is having frequency x 8-10 during the day most of which is occurring after she gets home from school.  She denies any real nocturia.  She has UUI, but does also have some ARIEL.  She wears a light pad daily and changes it once a day.  She denies any feeling of PVF.  She has a history of kidney stones and does not want to continue to limit her fluids.  She did have a UTI in may or June but has not had anything since then.  She denies any vaginal complaints/concerns.  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  She is willing to try an increase in medication but does not want to always have to take the higher dose of the myrbetriq.    Review of Systems   Constitutional: Negative for activity change, fever and unexpected weight change.   HENT: Negative for hearing loss.    Eyes: Negative for visual disturbance.   Respiratory: Negative for shortness of breath and wheezing.    Cardiovascular: Negative for chest pain, palpitations and leg swelling.   Gastrointestinal: Negative for abdominal pain, constipation and diarrhea.   Genitourinary: Positive for frequency and urgency. Negative for dyspareunia, dysuria, vaginal bleeding and vaginal discharge.   Musculoskeletal: Negative for gait problem and neck pain.   Skin: Negative for rash and wound.   Allergic/Immunologic: Negative for immunocompromised state.   Neurological: Negative for tremors, speech difficulty and weakness.   Hematological: Does not  bruise/bleed easily.   Psychiatric/Behavioral: Negative for agitation and confusion.       Objective:      Physical Exam   Constitutional: She is oriented to person, place, and time. She appears well-developed and well-nourished. No distress.   HENT:   Head: Normocephalic and atraumatic.   Neck: Neck supple. No thyromegaly present.   Pulmonary/Chest: Effort normal. No respiratory distress.   Abdominal: Soft. There is no tenderness. No hernia.   Musculoskeletal: Normal range of motion.   Neurological: She is alert and oriented to person, place, and time.   Skin: Skin is warm and dry. No rash noted.   Psychiatric: She has a normal mood and affect. Her behavior is normal.         Assessment:       1. Urinary frequency    2. Mixed incontinence urge and stress        Plan:       Urinary frequency- she will try taking the myrbetriq 50 mg Monday-Friday while at school.  She will also try to increase her water intake.  She will take the myrbetriq 25 mg on sat. And Sunday.  When school is out she would like to consider decreasing to the 25 mg again.  -     POCT urine dipstick without microscope  -     mirabegron 50 mg Tb24; Take 1 tablet (50 mg total) by mouth once daily.  Dispense: 30 tablet; Refill: 6    Mixed incontinence urge and stress- as noted above      RTC 8 months when school is out.

## 2018-11-22 PROBLEM — M21.611 BUNION, RIGHT FOOT: Status: ACTIVE | Noted: 2018-11-22

## 2018-11-22 PROBLEM — I73.00 RAYNAUD'S PHENOMENON WITHOUT GANGRENE: Status: ACTIVE | Noted: 2018-11-22

## 2018-12-12 LAB
ALBUMIN SERPL-MCNC: 4.3 G/DL (ref 3.1–4.7)
ALP SERPL-CCNC: 64 IU/L (ref 40–104)
ALT (SGPT): 13 IU/L (ref 3–33)
AST SERPL-CCNC: 23 IU/L (ref 10–40)
BILIRUB SERPL-MCNC: 0.4 MG/DL (ref 0.3–1)
BUN SERPL-MCNC: 15 MG/DL (ref 8–20)
CALCIUM SERPL-MCNC: 9.2 MG/DL (ref 7.7–10.4)
CHLORIDE: 103 MMOL/L (ref 98–110)
CO2 SERPL-SCNC: 28.4 MMOL/L (ref 22.8–31.6)
CREATININE: 0.82 MG/DL (ref 0.6–1.4)
GLUCOSE: 75 MG/DL (ref 70–99)
HCT VFR BLD AUTO: 40.3 % (ref 36–48)
HGB BLD-MCNC: 13 G/DL (ref 12–15)
MCH RBC QN AUTO: 31.7 PG (ref 25–35)
MCHC RBC AUTO-ENTMCNC: 32.3 G/DL (ref 31–36)
MCV RBC AUTO: 98.3 FL (ref 79–98)
MRSA SCREEN BY PCR: NORMAL
NUCLEATED RBCS: 0 %
PLATELET # BLD AUTO: 295 K/UL (ref 140–440)
POTASSIUM SERPL-SCNC: 4.1 MMOL/L (ref 3.5–5)
PROT SERPL-MCNC: 7.4 G/DL (ref 6–8.2)
RBC # BLD AUTO: 4.1 M/UL (ref 3.5–5.5)
SODIUM: 140 MMOL/L (ref 134–144)
WBC # BLD AUTO: 5.5 K/UL (ref 5–10)

## 2018-12-13 PROBLEM — R93.89 ABNORMAL CXR: Status: ACTIVE | Noted: 2018-12-13

## 2019-01-10 ENCOUNTER — OFFICE VISIT (OUTPATIENT)
Dept: PODIATRY | Facility: CLINIC | Age: 64
End: 2019-01-10
Payer: COMMERCIAL

## 2019-01-10 VITALS
BODY MASS INDEX: 18.4 KG/M2 | SYSTOLIC BLOOD PRESSURE: 126 MMHG | HEART RATE: 73 BPM | WEIGHT: 100 LBS | HEIGHT: 62 IN | TEMPERATURE: 98 F | DIASTOLIC BLOOD PRESSURE: 73 MMHG

## 2019-01-10 DIAGNOSIS — M20.41 HAMMER TOE OF RIGHT FOOT: Primary | ICD-10-CM

## 2019-01-10 DIAGNOSIS — M21.621 TAILOR'S BUNION OF RIGHT FOOT: ICD-10-CM

## 2019-01-10 PROBLEM — M20.11 HALLUX VALGUS OF RIGHT FOOT: Status: ACTIVE | Noted: 2019-01-10

## 2019-01-10 PROCEDURE — 99024 POSTOP FOLLOW-UP VISIT: CPT | Mod: ,,, | Performed by: PODIATRIST

## 2019-01-10 PROCEDURE — 99024 PR POST-OP FOLLOW-UP VISIT: ICD-10-PCS | Mod: ,,, | Performed by: PODIATRIST

## 2019-01-10 RX ORDER — CHOLECALCIFEROL (VITAMIN D3) 125 MCG
CAPSULE ORAL
COMMUNITY

## 2019-01-10 RX ORDER — IBANDRONATE SODIUM 150 MG/1
TABLET, FILM COATED ORAL
COMMUNITY

## 2019-01-10 RX ORDER — ESTRADIOL 0.05 MG/D
FILM, EXTENDED RELEASE TRANSDERMAL
COMMUNITY
End: 2019-01-31 | Stop reason: SDUPTHER

## 2019-01-10 RX ORDER — IBUPROFEN 600 MG/1
TABLET ORAL
COMMUNITY

## 2019-01-10 RX ORDER — GABAPENTIN 300 MG/1
CAPSULE ORAL
COMMUNITY

## 2019-01-10 RX ORDER — ERGOCALCIFEROL 1.25 MG/1
CAPSULE ORAL
COMMUNITY

## 2019-01-10 RX ORDER — AMLODIPINE BESYLATE 2.5 MG/1
TABLET ORAL
COMMUNITY

## 2019-01-10 RX ORDER — HYDROCODONE BITARTRATE AND ACETAMINOPHEN 7.5; 325 MG/1; MG/1
TABLET ORAL
Refills: 0 | COMMUNITY
Start: 2018-12-21

## 2019-01-10 RX ORDER — ESTRADIOL 0.03 MG/D
FILM, EXTENDED RELEASE TRANSDERMAL
COMMUNITY
End: 2019-01-31 | Stop reason: SDUPTHER

## 2019-01-10 RX ORDER — BUPROPION HYDROCHLORIDE 75 MG/1
TABLET ORAL
COMMUNITY
End: 2019-02-21

## 2019-01-10 RX ORDER — ONDANSETRON 4 MG/1
TABLET, FILM COATED ORAL
Refills: 0 | COMMUNITY
Start: 2018-12-21

## 2019-01-10 RX ORDER — PHENOBARBITAL WITH BELLADONNA ALKALOIDS 16.2; .1037; .0194; .0065 MG/5ML; MG/5ML; MG/5ML; MG/5ML
ELIXIR ORAL
COMMUNITY

## 2019-01-10 RX ORDER — PANTOPRAZOLE SODIUM 40 MG/1
TABLET, DELAYED RELEASE ORAL
COMMUNITY

## 2019-01-10 RX ORDER — METRONIDAZOLE 7.5 MG/G
GEL VAGINAL
COMMUNITY

## 2019-01-10 NOTE — PROGRESS NOTES
[address] [facility] [address]  [place] [address]  Phone: [phone]   [phone]    Patient's PCP:Primary Doctor No  Referring Provider:No ref. provider found    Subjective:      Chief Complaint: Post-Op    Date of Surgery: 12/21/18  Procedure: arthrodesis right foot second and third toe. Bunionectomy with osteotomy right foot. Flexor tenotomy PIPJ right foot fourth and fifth toes. Tenotomy and Capsulotomy right foot second third and fourth toes.    HPI:   Mary Fitzpatrick is a 63 y.o. female who returns to the clinic today for her 2nd postop visit.  Mary Fitzpatrick rates pain a 01/10 on a pain scale. Compliance of Care: keeping foot dry , elevation, and surgery shoe being worn.    Vitals:    01/10/19 1550   BP: 126/73   Pulse: 73   Temp: 98.3 °F (36.8 °C)       Past Surgical History:   Procedure Laterality Date    BLADDER SUSPENSION      2006 2012    BUNIONECTOMY      HYSTERECTOMY       Past Medical History:   Diagnosis Date    Allergy     Arthritis     HEARING LOSS     Mitral valve prolapse     Scoliosis      History reviewed. No pertinent family history.     Social History:   Marital Status:   Alcohol History:  reports that she drinks alcohol.  Tobacco History:  reports that  has never smoked. she has never used smokeless tobacco.  Drug History:  reports that she does not use drugs.    Review of patient's allergies indicates:   Allergen Reactions    Penicillins Anaphylaxis    Sulfa (sulfonamide antibiotics) Hives    Erythromycin base        Current Outpatient Medications   Medication Sig Dispense Refill    amLODIPine (NORVASC) 2.5 MG tablet amlodipine 2.5 mg tablet      biotin 5,000 mcg TbDL biotin   5000 mg      buPROPion (WELLBUTRIN) 75 MG tablet Wellbutrin 75 mg tablet   Take 1 tablet 3 times a day by oral route.      ergocalciferol (ERGOCALCIFEROL) 50,000 unit Cap Vitamin D2 50,000 unit capsule   TK 1 C PO Q 2 WEEKS      estradiol (VIVELLE-DOT) 0.025 mg/24 hr Vivelle-Dot  0.025 mg/24 hr transdermal patch   Apply 1 patch twice a week by transdermal route.      gabapentin (NEURONTIN) 100 MG capsule Take 100 mg by mouth every evening.      HYDROcodone-acetaminophen (NORCO) 7.5-325 mg per tablet TK 1 TO 2 TS PO Q 4 H PRN P  0    ibandronate (BONIVA) 150 mg tablet       ibuprofen (ADVIL,MOTRIN) 600 MG tablet ibuprofen   600 mg      mirabegron (MYRBETRIQ) 25 mg Tb24 ER tablet Myrbetriq 25 mg tablet,extended release   Take 1 tablet every day by oral route.      multivit with minerals/lutein (MULTIVITAMIN 50 PLUS ORAL) multivitamin      ondansetron (ZOFRAN) 4 MG tablet TK 1 T PO Q 8 H PRN N  0    pantoprazole (PROTONIX) 40 MG tablet TK 1 T PO  QAM  6    PAPAYA ENZYME ORAL Papaya Enzyme   20 mg      phenobarb-hyoscy-atropine-scop (DONNATAL) 16.2-0.1037 -0.0194 mg/5 mL Elix prn      ranitidine (ZANTAC) 150 MG tablet ranitidine 150 mg tablet      vitamin D 1000 units Tab Take 185 mg by mouth once daily.      estradiol 0.05 mg/24 hr td ptsw (VIVELLE-DOT) 0.05 mg/24 hr Place 1 patch onto the skin twice a week.       estradiol 0.05 mg/24 hr td ptsw (VIVELLE-DOT) 0.05 mg/24 hr estradiol 0.05 mg/24 hr semiweekly transdermal patch      gabapentin (NEURONTIN) 300 MG capsule gabapentin 300 mg capsule   Take 1 capsule 3 times a day by oral route.      ibandronate (BONIVA) 150 mg tablet ibandronate 150 mg tablet      metroNIDAZOLE (METROGEL) 0.75 % vaginal gel metronidazole 0.75 % vaginal gel      pantoprazole (PROTONIX) 40 MG tablet pantoprazole 40 mg tablet,delayed release   TAKE 1 TABLET BY MOUTH EVERY MORNING       No current facility-administered medications for this visit.        Review of Systems   Constitutional: Negative for chills, fatigue, fever and unexpected weight change.   HENT: Negative for hearing loss and sore throat.    Eyes: Negative for photophobia and visual disturbance.   Cardiovascular: Negative for chest pain, palpitations and leg swelling.   Musculoskeletal:  Negative for back pain and joint swelling.   Skin: Negative for rash and wound.   Neurological: Negative for weakness and headaches.         Objective:        Post-op surgery of the right foot hammertoe surgery 2,3,4,5 and tailors bunionectomy with osteotomy right foot with normal healing, no signs of infection or dehiscence of wound. Hardware in place. Normal post op exam today. No redness, no drainage, no increase in local temperature, no significant swelling, sutures.steri-strips are intact.     Imaging:       Ortho/SPM Exam  Physical Exam        Assessment and Plan:         3 weeks status post right foot surgery hammertoe surgery Tejal bunionectomy with osteotomy. Normal healing.    Procedures -   #1 removed Steri-Strips from right foot  #2 redress the surgical site with gauze and Luis Alberto  #3 continue Cam Walker boot cast ice and elevation as needed  #4 return to see me in 2 weeks for x-rays.  Follow-up in about 2 weeks (around 1/24/2019) for X-ray.        This note was created using Dragon voice recognition software that occasionally misinterpreted phrases or words.

## 2019-01-24 ENCOUNTER — OFFICE VISIT (OUTPATIENT)
Dept: PODIATRY | Facility: CLINIC | Age: 64
End: 2019-01-24
Payer: COMMERCIAL

## 2019-01-24 VITALS
TEMPERATURE: 99 F | SYSTOLIC BLOOD PRESSURE: 138 MMHG | HEIGHT: 62 IN | DIASTOLIC BLOOD PRESSURE: 84 MMHG | BODY MASS INDEX: 18.4 KG/M2 | HEART RATE: 86 BPM | WEIGHT: 100 LBS

## 2019-01-24 DIAGNOSIS — M79.671 FOOT PAIN, RIGHT: ICD-10-CM

## 2019-01-24 DIAGNOSIS — M20.41 HAMMER TOE OF RIGHT FOOT: Primary | ICD-10-CM

## 2019-01-24 PROCEDURE — 99024 POSTOP FOLLOW-UP VISIT: CPT | Mod: ,,, | Performed by: PODIATRIST

## 2019-01-24 PROCEDURE — 20670 PR REMOVAL SUPERFICIAL IMPLANT: ICD-10-PCS | Mod: 58,,, | Performed by: PODIATRIST

## 2019-01-24 PROCEDURE — 99024 PR POST-OP FOLLOW-UP VISIT: ICD-10-PCS | Mod: ,,, | Performed by: PODIATRIST

## 2019-01-24 PROCEDURE — 20670 REMOVAL IMPLANT SUPERFICIAL: CPT | Mod: 58,,, | Performed by: PODIATRIST

## 2019-01-24 RX ORDER — AZITHROMYCIN 250 MG/1
TABLET, FILM COATED ORAL
Refills: 2 | COMMUNITY
Start: 2019-01-20 | End: 2019-01-31 | Stop reason: ALTCHOICE

## 2019-01-24 NOTE — PROGRESS NOTES
[address]  [place] [address]  Phone: [phone]   [phone]    Patient's PCP:Primary Doctor No  Referring Provider:No ref. provider found    Subjective:      Chief Complaint: Post-Op    Date of Surgery: 12/21/18  Procedure: Arthrodesis right foot second and third toe tailorbunionectomy with osteotomy right foot. Flexor tenotomy PIPJ right foot fourth and fifth toes. Tenotomy and Capsulotomy right foot second third and fourth toes.    HPI:   Mary Fitzpatrick is a 63 y.o. female who returns to the clinic today for her 3rd postop visit.Patient states Friday night she woke up in the middle of the night with her foot hurting, it started oozing and her toes got swollen.She took some Azithromycin from her hand surgery and put Neosporin between her toes and took pain meds.  Mary Fitzpatrick rates pain a 2/10 on a pain scale. Compliance of Care: Bootcast weight-bearing,Ace wrap,Bandage,K-wires,Antibiotics.     Vitals:    01/24/19 1557   BP: 138/84   Pulse: 86   Temp: 98.6 °F (37 °C)       Past Surgical History:   Procedure Laterality Date    BLADDER SUSPENSION      2006 2012    BUNIONECTOMY      HYSTERECTOMY       Past Medical History:   Diagnosis Date    Allergy     Arthritis     HEARING LOSS     Mitral valve prolapse     Scoliosis      History reviewed. No pertinent family history.     Social History:   Marital Status:   Alcohol History:  reports that she drinks alcohol.  Tobacco History:  reports that  has never smoked. she has never used smokeless tobacco.  Drug History:  reports that she does not use drugs.    Review of patient's allergies indicates:   Allergen Reactions    Penicillins Anaphylaxis    Sulfa (sulfonamide antibiotics) Hives    Erythromycin base        Current Outpatient Medications   Medication Sig Dispense Refill    amLODIPine (NORVASC) 2.5 MG tablet amlodipine 2.5 mg tablet      biotin 5,000 mcg TbDL biotin   5000 mg      buPROPion (WELLBUTRIN) 75  MG tablet Wellbutrin 75 mg tablet   Take 1 tablet 3 times a day by oral route.      ergocalciferol (ERGOCALCIFEROL) 50,000 unit Cap Vitamin D2 50,000 unit capsule   TK 1 C PO Q 2 WEEKS      estradiol 0.05 mg/24 hr td ptsw (VIVELLE-DOT) 0.05 mg/24 hr estradiol 0.05 mg/24 hr semiweekly transdermal patch      gabapentin (NEURONTIN) 300 MG capsule gabapentin 300 mg capsule   Take 1 capsule 3 times a day by oral route.      HYDROcodone-acetaminophen (NORCO) 7.5-325 mg per tablet TK 1 TO 2 TS PO Q 4 H PRN P  0    ibandronate (BONIVA) 150 mg tablet       ibuprofen (ADVIL,MOTRIN) 600 MG tablet ibuprofen   600 mg      metroNIDAZOLE (METROGEL) 0.75 % vaginal gel metronidazole 0.75 % vaginal gel      mirabegron (MYRBETRIQ) 25 mg Tb24 ER tablet Myrbetriq 25 mg tablet,extended release   Take 1 tablet every day by oral route.      multivit with minerals/lutein (MULTIVITAMIN 50 PLUS ORAL) multivitamin      ondansetron (ZOFRAN) 4 MG tablet TK 1 T PO Q 8 H PRN N  0    pantoprazole (PROTONIX) 40 MG tablet TK 1 T PO  QAM  6    PAPAYA ENZYME ORAL Papaya Enzyme   20 mg      phenobarb-hyoscy-atropine-scop (DONNATAL) 16.2-0.1037 -0.0194 mg/5 mL Elix prn      ranitidine (ZANTAC) 150 MG tablet ranitidine 150 mg tablet      vitamin D 1000 units Tab Take 185 mg by mouth once daily.      azithromycin (Z-MC) 250 MG tablet   2    estradiol (VIVELLE-DOT) 0.025 mg/24 hr Vivelle-Dot 0.025 mg/24 hr transdermal patch   Apply 1 patch twice a week by transdermal route.      estradiol 0.05 mg/24 hr td ptsw (VIVELLE-DOT) 0.05 mg/24 hr Place 1 patch onto the skin twice a week.       gabapentin (NEURONTIN) 100 MG capsule Take 100 mg by mouth every evening.      ibandronate (BONIVA) 150 mg tablet ibandronate 150 mg tablet      pantoprazole (PROTONIX) 40 MG tablet pantoprazole 40 mg tablet,delayed release   TAKE 1 TABLET BY MOUTH EVERY MORNING       No current facility-administered medications for this visit.        Review of Systems    Constitutional: Negative for chills, fatigue, fever and unexpected weight change.   HENT: Negative for hearing loss and trouble swallowing.    Eyes: Negative for photophobia and visual disturbance.   Respiratory: Negative for cough, shortness of breath and wheezing.    Cardiovascular: Negative for chest pain, palpitations and leg swelling.   Gastrointestinal: Negative for abdominal pain and nausea.   Genitourinary: Negative for dysuria and frequency.   Musculoskeletal: Negative for arthralgias, back pain and joint swelling.   Skin: Negative for rash.   Neurological: Negative for tremors, seizures, weakness, numbness and headaches.   Hematological: Does not bruise/bleed easily.         Objective:        Post-op surgery of the arthrodesis PIPJ's second and third toe with 0.045 K wire, flexor tenotomy PIPJ fourth and fifth toes right foot with K wire fixation, tailors bunionectomy with 2.0 mm screws for fixation right foot with normal healing, no signs of infection or dehiscence of wound. Hardware in place. Normal post op exam today. No redness, no drainage, no increase in local temperature, no significant swelling, sutures.steri-strips are intact.     Imaging:   X-Ray Foot Complete Right  AP, lateral, lateral oblique weightbearing right foot x-rays. Normal healing of arthrodesis of toes PIPJ second and third toes flexor tenotomy of PIPJ fourth and fifth toes all with K wire fixation. Normal healing fifth metatarsal closing wedge osteotomy with 2.0 screws.    Ortho/SPM Exam  Physical Exam       Assessment:       1. Hammer toe of right foot    2. Foot pain, right      Plan:   Hammer toe of right foot  -     X-Ray Foot Complete Right    Foot pain, right    Plan:  #1 AP lateral lateral oblique weightbearing x-rays were taken today which showed normal healing  #2 today a remove the K wires toes 2,3,4,5. I dressed the area with Neosporin and with 4 x 4's and Luis Alberto.  #3  in 3 days the patient can transition into jogging  shoes  #4 keep a pad between her first and second toes to maintain alignment.  Follow-up in about 4 weeks (around 2/21/2019) for Post-op, X-Ray.    Procedures - None    The surgical dressing was removed showing no signs of infection, excess edema or malalignment. A new dry dressing was applied and patient was instructed to leave dressing intact until next visit or until instructed to remove.     This note was created using Dragon voice recognition software that occasionally misinterpreted phrases or words.

## 2019-01-31 ENCOUNTER — OFFICE VISIT (OUTPATIENT)
Dept: PODIATRY | Facility: CLINIC | Age: 64
End: 2019-01-31
Payer: COMMERCIAL

## 2019-01-31 VITALS
DIASTOLIC BLOOD PRESSURE: 80 MMHG | SYSTOLIC BLOOD PRESSURE: 123 MMHG | WEIGHT: 100 LBS | TEMPERATURE: 98 F | BODY MASS INDEX: 18.4 KG/M2 | HEIGHT: 62 IN | HEART RATE: 65 BPM

## 2019-01-31 DIAGNOSIS — L97.511 SKIN ULCER OF RIGHT FOOT, LIMITED TO BREAKDOWN OF SKIN: ICD-10-CM

## 2019-01-31 DIAGNOSIS — M20.41 HAMMER TOE OF RIGHT FOOT: Primary | ICD-10-CM

## 2019-01-31 PROCEDURE — 99024 PR POST-OP FOLLOW-UP VISIT: ICD-10-PCS | Mod: ,,, | Performed by: PODIATRIST

## 2019-01-31 PROCEDURE — 99024 POSTOP FOLLOW-UP VISIT: CPT | Mod: ,,, | Performed by: PODIATRIST

## 2019-01-31 RX ORDER — ATORVASTATIN CALCIUM 10 MG/1
0.5 TABLET, FILM COATED ORAL DAILY
COMMUNITY
Start: 2019-01-30

## 2019-01-31 NOTE — PROGRESS NOTES
[address] [facility] [address]  [place] [address]  Phone: [phone]   [phone]    Patient's PCP:Primary Doctor No  Referring Provider:No ref. provider found    Subjective:      Chief Complaint: Post-Op    Date of Surgery: 12/21/18  Procedure: Arthrodesis right foot second and third toe tailorbunionectomy with osteotomy right foot. Flexor tenotomy PIPJ right foot fourth and fifth toes. Tenotomy and Capsulotomy right foot second third and fourth toes.    HPI:   Mary Fitzpatrick is a 63 y.o. female who returns to the clinic today for her 4th postop visit.  Mary Fitzpatrick rates pain a 9/10 on a pain scale. Compliance of Care:  Wearing fx boot.   Saturday she noticed a spot which she thought was a scab at the bottom part of her incision.  Sunday it fell disappeared. Reappeared and is present today.  Area is extremely sensitive to any touch or pressure.    Vitals:    01/31/19 1533   BP: 123/80   Pulse: 65   Temp: 97.9 °F (36.6 °C)       Past Surgical History:   Procedure Laterality Date    BLADDER SUSPENSION      2006 2012    BUNIONECTOMY      HYSTERECTOMY       Past Medical History:   Diagnosis Date    Allergy     Arthritis     HEARING LOSS     Mitral valve prolapse     Scoliosis     Skin ulcer of right foot, limited to breakdown of skin 1/31/2019     History reviewed. No pertinent family history.     Social History:   Marital Status:   Alcohol History:  reports that she drinks alcohol.  Tobacco History:  reports that  has never smoked. she has never used smokeless tobacco.  Drug History:  reports that she does not use drugs.    Review of patient's allergies indicates:   Allergen Reactions    Penicillins Anaphylaxis    Sulfa (sulfonamide antibiotics) Hives    Erythromycin base        Current Outpatient Medications   Medication Sig Dispense Refill    amLODIPine (NORVASC) 2.5 MG tablet amlodipine 2.5 mg tablet      atorvastatin (LIPITOR) 10 MG tablet Take 0.5 tablets by mouth once  daily.      biotin 5,000 mcg TbDL biotin   5000 mg      buPROPion (WELLBUTRIN) 75 MG tablet Wellbutrin 75 mg tablet   Take 1 tablet 3 times a day by oral route.      ergocalciferol (ERGOCALCIFEROL) 50,000 unit Cap Vitamin D2 50,000 unit capsule   TK 1 C PO Q 2 WEEKS      estradiol 0.05 mg/24 hr td ptsw (VIVELLE-DOT) 0.05 mg/24 hr Place 1 patch onto the skin twice a week.       gabapentin (NEURONTIN) 100 MG capsule Take 100 mg by mouth every evening.      gabapentin (NEURONTIN) 300 MG capsule gabapentin 300 mg capsule   Take 1 capsule 3 times a day by oral route.      HYDROcodone-acetaminophen (NORCO) 7.5-325 mg per tablet TK 1 TO 2 TS PO Q 4 H PRN P  0    ibandronate (BONIVA) 150 mg tablet ibandronate 150 mg tablet      ibuprofen (ADVIL,MOTRIN) 600 MG tablet ibuprofen   600 mg      metroNIDAZOLE (METROGEL) 0.75 % vaginal gel metronidazole 0.75 % vaginal gel      mirabegron (MYRBETRIQ) 25 mg Tb24 ER tablet Myrbetriq 25 mg tablet,extended release   Take 1 tablet every day by oral route.      multivit with minerals/lutein (MULTIVITAMIN 50 PLUS ORAL) multivitamin      ondansetron (ZOFRAN) 4 MG tablet TK 1 T PO Q 8 H PRN N  0    pantoprazole (PROTONIX) 40 MG tablet TK 1 T PO  QAM  6    pantoprazole (PROTONIX) 40 MG tablet pantoprazole 40 mg tablet,delayed release   TAKE 1 TABLET BY MOUTH EVERY MORNING      PAPAYA ENZYME ORAL Papaya Enzyme   20 mg      phenobarb-hyoscy-atropine-scop (DONNATAL) 16.2-0.1037 -0.0194 mg/5 mL Elix prn      ranitidine (ZANTAC) 150 MG tablet ranitidine 150 mg tablet      vitamin D 1000 units Tab Take 185 mg by mouth once daily.       No current facility-administered medications for this visit.        Review of Systems   Constitutional: Negative for chills, fatigue, fever and unexpected weight change.   HENT: Negative for hearing loss and trouble swallowing.    Eyes: Negative for photophobia and visual disturbance.   Respiratory: Negative for cough, shortness of breath and  wheezing.    Cardiovascular: Negative for chest pain, palpitations and leg swelling.   Gastrointestinal: Negative for abdominal pain and nausea.   Genitourinary: Negative for dysuria and frequency.   Musculoskeletal: Negative for arthralgias, back pain and joint swelling.   Skin: Negative for rash.   Neurological: Negative for tremors, seizures, weakness, numbness and headaches.   Hematological: Does not bruise/bleed easily.         Objective:        Post-op surgery of the Arthrodesis right foot second and third toe tailorbunionectomy with osteotomy right foot. Flexor tenotomy PIPJ right foot fourth and fifth toes. Tenotomy and Capsulotomy right foot second third and fourth toes. with normal healing, no signs of infection or dehiscence of wound. Hardware in place. Normal post op exam today. No redness, no drainage, no increase in local temperature, no significant swelling, sutures.steri-strips are intact.     Imaging:     Physical Exam:   Foot Exam    Right Foot/Ankle     Comments  Base of third toe incision right foot with small area with possible suture dehiscence. 1 mm x 2 mm diameter superficial opening with granulation tissue and no signs of infection. No visible suturing the area today. The surgical sites and the rest of the toes in the alignment all look normal for the postop healing.        Physical Exam       Assessment:       1. Hammer toe of right foot    2. Skin ulcer of right foot, limited to breakdown of skin      Plan:   Hammer toe of right foot    Skin ulcer of right foot, limited to breakdown of skin    #1 day applied silver nitrate to the area to cauterize a small granuloma that appears to be present. #2 the patient will soak in Epsom salt water and apply Betadine to dry the area. If she sees a piece of suture material she can attempt to remove it fits of problems she should return to see me and I will remove it. Otherwise she is to return to see me in 3 weeks for x-rays. Continue use of running  shoe and splint toes.  Follow-up in about 3 weeks (around 2/21/2019).    Procedures - None    The surgical dressing was removed showing no signs of infection, excess edema or malalignment. A new dry dressing was applied and patient was instructed to leave dressing intact until next visit or until instructed to remove.     This note was created using Dragon voice recognition software that occasionally misinterpreted phrases or words.

## 2019-02-21 ENCOUNTER — OFFICE VISIT (OUTPATIENT)
Dept: PODIATRY | Facility: CLINIC | Age: 64
End: 2019-02-21
Payer: COMMERCIAL

## 2019-02-21 DIAGNOSIS — M20.41 HAMMER TOE OF RIGHT FOOT: Primary | ICD-10-CM

## 2019-02-21 DIAGNOSIS — M21.621 TAILOR'S BUNION OF RIGHT FOOT: ICD-10-CM

## 2019-02-21 PROCEDURE — 99024 PR POST-OP FOLLOW-UP VISIT: ICD-10-PCS | Mod: ,,, | Performed by: PODIATRIST

## 2019-02-21 PROCEDURE — 99024 POSTOP FOLLOW-UP VISIT: CPT | Mod: ,,, | Performed by: PODIATRIST

## 2019-02-21 PROCEDURE — 73630 X-RAY EXAM OF FOOT: CPT | Mod: RT,,, | Performed by: PODIATRIST

## 2019-02-21 PROCEDURE — 73630 PR  X-RAY FOOT 3+ VW: ICD-10-PCS | Mod: RT,,, | Performed by: PODIATRIST

## 2019-02-21 RX ORDER — BUPROPION HYDROCHLORIDE 100 MG/1
TABLET, EXTENDED RELEASE ORAL
Refills: 2 | COMMUNITY
Start: 2019-01-31

## 2019-02-21 NOTE — PROGRESS NOTES
[address] [facility] [address] [place] [address]  Phone: [phone]   [phone]    Patient's PCP:Primary Doctor No  Referring Provider:No ref. provider found    Subjective:      Chief Complaint: Post-Op    Date of Surgery: 12/21/18 (8 weeks 6 days)  Procedure: Arthrodesis right foot second and third toe tailorbunionectomy with osteotomy right foot. Flexor tenotomy PIPJ right foot fourth and fifth toes. Tenotomy and Capsulotomy right foot second third and fourth toes.    HPI:   Mary Fitzpatrick is a 63 y.o. female who returns to the clinic today for her 5th postop visit.  Mary Fitzpatrick rates pain a 1/10 on a pain scale.     There were no vitals filed for this visit.    Past Surgical History:   Procedure Laterality Date    BLADDER SUSPENSION      2006 2012    BUNIONECTOMY      HYSTERECTOMY       Past Medical History:   Diagnosis Date    Allergy     Arthritis     HEARING LOSS     Mitral valve prolapse     Scoliosis     Skin ulcer of right foot, limited to breakdown of skin 1/31/2019     History reviewed. No pertinent family history.     Social History:   Marital Status:   Alcohol History:  reports that she drinks alcohol.  Tobacco History:  reports that  has never smoked. she has never used smokeless tobacco.  Drug History:  reports that she does not use drugs.    Review of patient's allergies indicates:   Allergen Reactions    Penicillins Anaphylaxis    Sulfa (sulfonamide antibiotics) Hives    Erythromycin base        Current Outpatient Medications   Medication Sig Dispense Refill    amLODIPine (NORVASC) 2.5 MG tablet amlodipine 2.5 mg tablet      atorvastatin (LIPITOR) 10 MG tablet Take 0.5 tablets by mouth once daily.      biotin 5,000 mcg TbDL biotin   5000 mg      buPROPion (WELLBUTRIN SR) 100 MG TBSR 12 hr tablet TK 1 T PO QD  2    ergocalciferol (ERGOCALCIFEROL) 50,000 unit Cap Vitamin D2 50,000 unit capsule   TK 1 C PO Q 2 WEEKS      estradiol 0.05 mg/24 hr td ptsw  (VIVELLE-DOT) 0.05 mg/24 hr Place 1 patch onto the skin twice a week.       gabapentin (NEURONTIN) 100 MG capsule Take 100 mg by mouth every evening.      gabapentin (NEURONTIN) 300 MG capsule gabapentin 300 mg capsule   Take 1 capsule 3 times a day by oral route.      HYDROcodone-acetaminophen (NORCO) 7.5-325 mg per tablet TK 1 TO 2 TS PO Q 4 H PRN P  0    ibandronate (BONIVA) 150 mg tablet ibandronate 150 mg tablet      ibuprofen (ADVIL,MOTRIN) 600 MG tablet ibuprofen   600 mg      metroNIDAZOLE (METROGEL) 0.75 % vaginal gel metronidazole 0.75 % vaginal gel      mirabegron (MYRBETRIQ) 25 mg Tb24 ER tablet Myrbetriq 25 mg tablet,extended release   Take 1 tablet every day by oral route.      multivit with minerals/lutein (MULTIVITAMIN 50 PLUS ORAL) multivitamin      ondansetron (ZOFRAN) 4 MG tablet TK 1 T PO Q 8 H PRN N  0    pantoprazole (PROTONIX) 40 MG tablet TK 1 T PO  QAM  6    pantoprazole (PROTONIX) 40 MG tablet pantoprazole 40 mg tablet,delayed release   TAKE 1 TABLET BY MOUTH EVERY MORNING      PAPAYA ENZYME ORAL Papaya Enzyme   20 mg      phenobarb-hyoscy-atropine-scop (DONNATAL) 16.2-0.1037 -0.0194 mg/5 mL Elix prn      ranitidine (ZANTAC) 150 MG tablet ranitidine 150 mg tablet      vitamin D 1000 units Tab Take 185 mg by mouth once daily.       No current facility-administered medications for this visit.        Review of Systems      Objective:        Post-op surgery of the arthrodesis toes 2, 3, 4 and tailor's bunionectomy with 5th metatarsal osteotomy and screw fixation with normal healing, no signs of infection or dehiscence of wound. Hardware in place. Normal post op exam today. No redness, no drainage, no increase in local temperature, no significant swelling, sutures.steri-strips are intact.     Imaging:   X-Ray Foot Complete Right  AP, Lateral, Lateral oblique weight bearing right foot:  Normal post op arthrodesis of PIPJ toes 2, 3, 4, and tailor's bunionectomy with 5th metatarsal  ostetomy.    Physical Exam:   Foot Exam  Physical Exam       Assessment:       1. Hammer toe of right foot    2. Tailor's bunion of right foot      Plan:   Hammer toe of right foot    Tailor's bunion of right foot    Other orders  -     X-Ray Foot Complete Right      No Follow-up on file.    Procedures - None  Continue running shoes, gradually increase acxtivity level splint toes in rectus for 2 weeks.  OTC nsaids as needed,  Return 6 weeks    This note was created using Dragon voice recognition software that occasionally misinterpreted phrases or words.

## 2019-02-23 PROBLEM — M21.621 TAILOR'S BUNION OF RIGHT FOOT: Status: ACTIVE | Noted: 2019-02-23

## 2019-03-18 ENCOUNTER — OFFICE VISIT (OUTPATIENT)
Dept: PODIATRY | Facility: CLINIC | Age: 64
End: 2019-03-18
Payer: COMMERCIAL

## 2019-03-18 VITALS
HEIGHT: 62 IN | DIASTOLIC BLOOD PRESSURE: 76 MMHG | BODY MASS INDEX: 18.4 KG/M2 | SYSTOLIC BLOOD PRESSURE: 117 MMHG | WEIGHT: 100 LBS | HEART RATE: 69 BPM | TEMPERATURE: 98 F

## 2019-03-18 DIAGNOSIS — M20.41 HAMMER TOE OF RIGHT FOOT: Primary | ICD-10-CM

## 2019-03-18 DIAGNOSIS — M79.671 FOOT PAIN, RIGHT: ICD-10-CM

## 2019-03-18 DIAGNOSIS — M21.621 TAILOR'S BUNION OF RIGHT FOOT: ICD-10-CM

## 2019-03-18 PROCEDURE — 73630 PR  X-RAY FOOT 3+ VW: ICD-10-PCS | Mod: RT,,, | Performed by: PODIATRIST

## 2019-03-18 PROCEDURE — 99024 PR POST-OP FOLLOW-UP VISIT: ICD-10-PCS | Mod: ,,, | Performed by: PODIATRIST

## 2019-03-18 PROCEDURE — 73630 X-RAY EXAM OF FOOT: CPT | Mod: RT,,, | Performed by: PODIATRIST

## 2019-03-18 PROCEDURE — 99024 POSTOP FOLLOW-UP VISIT: CPT | Mod: ,,, | Performed by: PODIATRIST

## 2019-03-18 NOTE — PROGRESS NOTES
[address] [facility] [address]  [place] [address]  Phone: [phone]   [phone]    Patient's PCP:Primary Doctor No  Referring Provider:No ref. provider found    Subjective:      Chief Complaint: Post-Op      Date of Surgery: 12/21/2018  Procedure: Arthrodesis right foot second and third toe tailorbunionectomy with osteotomy right foot. Flexor tenotomy PIPJ right foot fourth and fifth toes. Tenotomy and Capsulotomy right foot second third and fourth toes.    HPI:   Mary Fitzpatrick is a 63 y.o. female who returns to the clinic today for her pre-operative visit. Mary Fitzpatrick rates pain a 1/10 on a pain scale. Compliance of Care:  Proper running shoes and resting foot as needed    Vitals:    03/18/19 1617   BP: 117/76   Pulse: 69   Temp: 97.9 °F (36.6 °C)       Past Surgical History:   Procedure Laterality Date    BLADDER SUSPENSION      2006 2012    BUNIONECTOMY      HYSTERECTOMY       Past Medical History:   Diagnosis Date    Allergy     Arthritis     HEARING LOSS     Mitral valve prolapse     Scoliosis     Skin ulcer of right foot, limited to breakdown of skin 1/31/2019     History reviewed. No pertinent family history.     Social History:   Marital Status:   Alcohol History:  reports that she drinks alcohol.  Tobacco History:  reports that  has never smoked. she has never used smokeless tobacco.  Drug History:  reports that she does not use drugs.    Review of patient's allergies indicates:   Allergen Reactions    Penicillins Anaphylaxis    Sulfa (sulfonamide antibiotics) Hives    Erythromycin base        Current Outpatient Medications   Medication Sig Dispense Refill    amLODIPine (NORVASC) 2.5 MG tablet amlodipine 2.5 mg tablet      atorvastatin (LIPITOR) 10 MG tablet Take 0.5 tablets by mouth once daily.      biotin 5,000 mcg TbDL biotin   5000 mg      buPROPion (WELLBUTRIN SR) 100 MG TBSR 12 hr tablet TK 1 T PO QD  2    ergocalciferol (ERGOCALCIFEROL) 50,000 unit Cap  Vitamin D2 50,000 unit capsule   TK 1 C PO Q 2 WEEKS      estradiol 0.05 mg/24 hr td ptsw (VIVELLE-DOT) 0.05 mg/24 hr Place 1 patch onto the skin twice a week.       gabapentin (NEURONTIN) 100 MG capsule Take 100 mg by mouth every evening.      gabapentin (NEURONTIN) 300 MG capsule gabapentin 300 mg capsule   Take 1 capsule 3 times a day by oral route.      HYDROcodone-acetaminophen (NORCO) 7.5-325 mg per tablet TK 1 TO 2 TS PO Q 4 H PRN P  0    ibandronate (BONIVA) 150 mg tablet ibandronate 150 mg tablet      ibuprofen (ADVIL,MOTRIN) 600 MG tablet ibuprofen   600 mg      metroNIDAZOLE (METROGEL) 0.75 % vaginal gel metronidazole 0.75 % vaginal gel      mirabegron (MYRBETRIQ) 25 mg Tb24 ER tablet Myrbetriq 25 mg tablet,extended release   Take 1 tablet every day by oral route.      multivit with minerals/lutein (MULTIVITAMIN 50 PLUS ORAL) multivitamin      ondansetron (ZOFRAN) 4 MG tablet TK 1 T PO Q 8 H PRN N  0    pantoprazole (PROTONIX) 40 MG tablet TK 1 T PO  QAM  6    pantoprazole (PROTONIX) 40 MG tablet pantoprazole 40 mg tablet,delayed release   TAKE 1 TABLET BY MOUTH EVERY MORNING      PAPAYA ENZYME ORAL Papaya Enzyme   20 mg      phenobarb-hyoscy-atropine-scop (DONNATAL) 16.2-0.1037 -0.0194 mg/5 mL Elix prn      ranitidine (ZANTAC) 150 MG tablet ranitidine 150 mg tablet      vitamin D 1000 units Tab Take 185 mg by mouth once daily.       No current facility-administered medications for this visit.        Review of Systems      Objective:        Post-op surgery of the arthrodesis pipj 2,3,4,toes, tailors's bunionectomy with osteotomy with screw fixation right foot, tenotomy capsulotomy 2nd, 3rd 4th mpjs  with normal healing, no signs of infection or dehiscence of wound. Hardware in place. Normal post op exam today. No redness, no drainage, no increase in local temperature, no significant swelling, sutures.steri-strips are intact.     Imaging:   X-Ray Foot Complete Right  AP, Lateral, Lateral  oblique weight bearing right foot:  Normal post op arthrodesis of PIPJ toes 2, 3, 4, and tailor's bunionectomy with 5th metatarsal ostetomy.    Physical Exam:   Foot Exam  Physical Exam       Assessment:       1. Foot pain, right    2. Tailor's bunion of right foot    3. Hammer toe of right foot      Plan:   Foot pain, right    Tailor's bunion of right foot    Hammer toe of right foot    Other orders  -     X-Ray Foot Complete Right    gradually increase acxtivities level and ice as needed and work on ROM.  See me in 2 months.  Follow-up in about 2 months (around 5/18/2019), or if symptoms worsen or fail to improve, for Follow up.    Procedures - None    The surgical dressing was removed showing no signs of infection, excess edema or malalignment. A new dry dressing was applied and patient was instructed to leave dressing intact until next visit or until instructed to remove.     This note was created using Dragon voice recognition software that occasionally misinterpreted phrases or words.

## 2019-08-15 ENCOUNTER — HOSPITAL ENCOUNTER (OUTPATIENT)
Dept: RADIOLOGY | Facility: HOSPITAL | Age: 64
Discharge: HOME | End: 2019-08-15
Attending: NEUROLOGICAL SURGERY
Payer: COMMERCIAL

## 2019-08-15 DIAGNOSIS — M54.12 CERVICAL RADICULITIS: Primary | ICD-10-CM

## 2019-08-15 DIAGNOSIS — M54.12 CERVICAL RADICULITIS: ICD-10-CM

## 2019-08-15 PROCEDURE — 72040 X-RAY EXAM NECK SPINE 2-3 VW: CPT | Mod: TC,PO

## 2019-09-26 ENCOUNTER — HOSPITAL ENCOUNTER (OUTPATIENT)
Dept: RADIOLOGY | Facility: HOSPITAL | Age: 64
Discharge: HOME | End: 2019-09-26
Attending: NEUROLOGICAL SURGERY
Payer: COMMERCIAL

## 2019-09-26 ENCOUNTER — OFFICE VISIT (OUTPATIENT)
Dept: UROGYNECOLOGY | Facility: CLINIC | Age: 64
End: 2019-09-26
Payer: COMMERCIAL

## 2019-09-26 VITALS
HEART RATE: 70 BPM | BODY MASS INDEX: 18.01 KG/M2 | DIASTOLIC BLOOD PRESSURE: 63 MMHG | WEIGHT: 97.88 LBS | HEIGHT: 62 IN | SYSTOLIC BLOOD PRESSURE: 109 MMHG

## 2019-09-26 DIAGNOSIS — M54.12 BRACHIAL NEURITIS: Primary | ICD-10-CM

## 2019-09-26 DIAGNOSIS — N39.46 MIXED INCONTINENCE URGE AND STRESS: ICD-10-CM

## 2019-09-26 DIAGNOSIS — R35.0 URINARY FREQUENCY: Primary | ICD-10-CM

## 2019-09-26 DIAGNOSIS — M54.12 BRACHIAL NEURITIS: ICD-10-CM

## 2019-09-26 PROCEDURE — 99213 PR OFFICE/OUTPT VISIT, EST, LEVL III, 20-29 MIN: ICD-10-PCS | Mod: S$GLB,,, | Performed by: NURSE PRACTITIONER

## 2019-09-26 PROCEDURE — 3008F BODY MASS INDEX DOCD: CPT | Mod: CPTII,S$GLB,, | Performed by: NURSE PRACTITIONER

## 2019-09-26 PROCEDURE — 99999 PR PBB SHADOW E&M-EST. PATIENT-LVL IV: ICD-10-PCS | Mod: PBBFAC,,, | Performed by: NURSE PRACTITIONER

## 2019-09-26 PROCEDURE — 3008F PR BODY MASS INDEX (BMI) DOCUMENTED: ICD-10-PCS | Mod: CPTII,S$GLB,, | Performed by: NURSE PRACTITIONER

## 2019-09-26 PROCEDURE — 72040 X-RAY EXAM NECK SPINE 2-3 VW: CPT | Mod: TC,PO

## 2019-09-26 PROCEDURE — 99999 PR PBB SHADOW E&M-EST. PATIENT-LVL IV: CPT | Mod: PBBFAC,,, | Performed by: NURSE PRACTITIONER

## 2019-09-26 PROCEDURE — 99213 OFFICE O/P EST LOW 20 MIN: CPT | Mod: S$GLB,,, | Performed by: NURSE PRACTITIONER

## 2019-09-26 NOTE — PROGRESS NOTES
Subjective:       Patient ID: Mary Fitzpatrick is a 63 y.o. female.    Chief Complaint: med check      Mary Fitzpatrick is a 63 y.o. female who presents today for yearly medication follow up.  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  She has been taking the myrbetriq 50 mg during the week and feels that this works well for her.  She has frequency x 8 during the day, but most of this is after about 330 when school is dismissed.  She does occasionally have some UUI and she has to wear a thin panty liner for protection.  However, at this time she is mostly satisfied with her status.  She does like to take the myrbetriq 25 mg on sat and Sunday or when she doesn't have school.  She denies any other acute complaints/concerns at this time.    Review of Systems   Constitutional: Negative for activity change, fever and unexpected weight change.   HENT: Negative for hearing loss.    Eyes: Negative for visual disturbance.   Respiratory: Negative for shortness of breath and wheezing.    Cardiovascular: Negative for chest pain, palpitations and leg swelling.   Gastrointestinal: Negative for abdominal pain, constipation and diarrhea.   Genitourinary: Positive for frequency and urgency. Negative for dyspareunia, dysuria, vaginal bleeding and vaginal discharge.   Musculoskeletal: Negative for gait problem and neck pain.   Skin: Negative for rash and wound.   Allergic/Immunologic: Negative for immunocompromised state.   Neurological: Negative for tremors, speech difficulty and weakness.   Hematological: Does not bruise/bleed easily.   Psychiatric/Behavioral: Negative for agitation and confusion.       Objective:      Physical Exam   Constitutional: She is oriented to person, place, and time. She appears well-developed and well-nourished. No distress.   HENT:   Head: Normocephalic and atraumatic.   Neck: Neck supple. No thyromegaly present.   Pulmonary/Chest: Effort normal. No respiratory distress.   Abdominal:  Soft. There is no tenderness. No hernia.   Musculoskeletal: Normal range of motion.   Neurological: She is alert and oriented to person, place, and time.   Skin: Skin is warm and dry. No rash noted.   Psychiatric: She has a normal mood and affect. Her behavior is normal.     Pelvic Exam:  deferred      Assessment:       1. Urinary frequency    2. Mixed incontinence urge and stress        Plan:       Urinary frequency- she will take the myrbetriq 50 mg during the week and 25 mg on the weekend.  -     mirabegron (MYRBETRIQ) 50 mg Tb24; Take 1 tablet (50 mg total) by mouth once daily.  Dispense: 30 tablet; Refill: 11    Mixed incontinence urge and stress- as noted above    RTC 1 year or PRN

## 2019-12-23 DIAGNOSIS — Z12.31 ENCOUNTER FOR SCREENING MAMMOGRAM FOR BREAST CANCER: Primary | ICD-10-CM

## 2019-12-31 ENCOUNTER — HOSPITAL ENCOUNTER (OUTPATIENT)
Dept: RADIOLOGY | Facility: HOSPITAL | Age: 64
Discharge: HOME | End: 2019-12-31
Attending: NEUROLOGICAL SURGERY
Payer: COMMERCIAL

## 2019-12-31 DIAGNOSIS — M50.30 DEGENERATION OF CERVICAL INTERVERTEBRAL DISC: Primary | ICD-10-CM

## 2019-12-31 DIAGNOSIS — M50.30 DEGENERATION OF CERVICAL INTERVERTEBRAL DISC: ICD-10-CM

## 2019-12-31 PROCEDURE — 72040 X-RAY EXAM NECK SPINE 2-3 VW: CPT | Mod: TC,PO

## 2020-01-07 ENCOUNTER — HOSPITAL ENCOUNTER (OUTPATIENT)
Dept: RADIOLOGY | Facility: HOSPITAL | Age: 65
Discharge: HOME | End: 2020-01-07
Attending: INTERNAL MEDICINE
Payer: COMMERCIAL

## 2020-01-07 VITALS — WEIGHT: 97.88 LBS | BODY MASS INDEX: 18.01 KG/M2 | HEIGHT: 62 IN

## 2020-01-07 DIAGNOSIS — Z12.31 ENCOUNTER FOR SCREENING MAMMOGRAM FOR BREAST CANCER: ICD-10-CM

## 2020-01-07 PROCEDURE — 77067 SCR MAMMO BI INCL CAD: CPT | Mod: TC,PO

## 2020-06-17 ENCOUNTER — HOSPITAL ENCOUNTER (OUTPATIENT)
Dept: RADIOLOGY | Facility: HOSPITAL | Age: 65
Discharge: HOME | End: 2020-06-17
Attending: PHYSICIAN ASSISTANT
Payer: COMMERCIAL

## 2020-06-17 DIAGNOSIS — M50.30 DEGENERATION OF CERVICAL INTERVERTEBRAL DISC: ICD-10-CM

## 2020-06-17 PROCEDURE — 72040 X-RAY EXAM NECK SPINE 2-3 VW: CPT | Mod: TC,PO

## 2020-11-03 ENCOUNTER — PATIENT OUTREACH (OUTPATIENT)
Dept: ADMINISTRATIVE | Facility: HOSPITAL | Age: 65
End: 2020-11-03

## 2020-11-03 NOTE — LETTER
AUTHORIZATION FOR RELEASE OF   CONFIDENTIAL INFORMATION    Dear Adriana,    We are seeing Mary Fitzpatrick, date of birth 1955, in the clinic at [facility] DR. MONICA ALLEN III. Monica Allen III, MD is the patient's PCP. Mary Fitzpatrick has an outstanding lab/procedure at the time we reviewed her chart. In order to help keep her health information updated, she has authorized us to request the following medical record(s):        (  )  MAMMOGRAM                                      (  X)  COLONOSCOPY      (  )  PAP SMEAR                                          (  )  OUTSIDE LAB RESULTS     (  )  DEXA SCAN                                          (  )  EYE EXAM            (  )  FOOT EXAM                                          (  )  ENTIRE RECORD     (  )  OUTSIDE IMMUNIZATIONS                 (  )  _______________         Please fax records to [facility], Clinton H Sharp III, MD, [phone]  Thank you in advance,      Calli BLACK  Panel Care Coordinator  [facility] [address] [place] [address]  Phone [phone]  Fax [phone]        Patient Name: Mary Fitzpatrick  : 1955  Patient Phone #: [phone]

## 2020-11-04 ENCOUNTER — PATIENT OUTREACH (OUTPATIENT)
Dept: ADMINISTRATIVE | Facility: HOSPITAL | Age: 65
End: 2020-11-04

## 2021-01-04 ENCOUNTER — PATIENT MESSAGE (OUTPATIENT)
Dept: ADMINISTRATIVE | Facility: HOSPITAL | Age: 66
End: 2021-01-04

## 2021-04-05 ENCOUNTER — PATIENT MESSAGE (OUTPATIENT)
Dept: ADMINISTRATIVE | Facility: HOSPITAL | Age: 66
End: 2021-04-05

## 2021-05-06 ENCOUNTER — PATIENT MESSAGE (OUTPATIENT)
Dept: RESEARCH | Facility: HOSPITAL | Age: 66
End: 2021-05-06

## 2021-07-06 ENCOUNTER — PATIENT MESSAGE (OUTPATIENT)
Dept: ADMINISTRATIVE | Facility: HOSPITAL | Age: 66
End: 2021-07-06

## 2021-10-05 ENCOUNTER — PATIENT MESSAGE (OUTPATIENT)
Dept: ADMINISTRATIVE | Facility: HOSPITAL | Age: 66
End: 2021-10-05